# Patient Record
Sex: FEMALE | Race: WHITE | Employment: OTHER | URBAN - NONMETROPOLITAN AREA
[De-identification: names, ages, dates, MRNs, and addresses within clinical notes are randomized per-mention and may not be internally consistent; named-entity substitution may affect disease eponyms.]

---

## 2015-03-05 LAB
CREAT SERPL-MCNC: 0.9 MG/DL (ref 0.7–1.2)
GLUCOSE SERPL-MCNC: 89 MG/DL (ref 60–99)
POTASSIUM SERPL-SCNC: 4.2 MEQ/L (ref 3.5–5.1)
TSH SERPL-ACNC: 1.98 MIU/ML (ref 0.35–4.8)

## 2016-12-24 LAB
ALT SERPL-CCNC: 19 U/L (ref 9–41)
AST SERPL-CCNC: 19 U/L (ref 12–38)
CREAT SERPL-MCNC: 0.9 MG/DL (ref 0.7–1.4)
GLUCOSE SERPL-MCNC: 91 MG/DL (ref 64–112)
POTASSIUM SERPL-SCNC: 4.6 MMOL/L (ref 3.5–5.3)

## 2017-05-05 LAB
CHOLEST SERPL-MCNC: 187 MG/DL (ref 130–200)
HDLC SERPL-MCNC: 45 MG/DL (ref 32–96)
LDLC SERPL CALC-MCNC: 125 MG/DL (ref 3–130)
TRIGL SERPL-MCNC: 87 MG/DL (ref 35–135)

## 2017-05-08 ENCOUNTER — TRANSFERRED RECORDS (OUTPATIENT)
Dept: HEALTH INFORMATION MANAGEMENT | Facility: HOSPITAL | Age: 72
End: 2017-05-08

## 2017-05-10 ENCOUNTER — TRANSFERRED RECORDS (OUTPATIENT)
Dept: HEALTH INFORMATION MANAGEMENT | Facility: HOSPITAL | Age: 72
End: 2017-05-10

## 2017-06-03 ENCOUNTER — TRANSFERRED RECORDS (OUTPATIENT)
Dept: HEALTH INFORMATION MANAGEMENT | Facility: HOSPITAL | Age: 72
End: 2017-06-03

## 2017-06-03 LAB
ALT SERPL-CCNC: 19 U/L (ref 9–41)
AST SERPL-CCNC: 17 U/L (ref 12–38)
CREAT SERPL-MCNC: 0.9 MG/DL (ref 0.7–1.4)
GLUCOSE SERPL-MCNC: 112 MG/DL (ref 64–112)
POTASSIUM SERPL-SCNC: 3.9 MMOL/L (ref 3.5–5.3)
TSH SERPL-ACNC: 1.54 UIU/ML (ref 0.34–4.94)

## 2017-06-05 ENCOUNTER — TRANSFERRED RECORDS (OUTPATIENT)
Dept: HEALTH INFORMATION MANAGEMENT | Facility: HOSPITAL | Age: 72
End: 2017-06-05

## 2017-06-05 DIAGNOSIS — R29.90 STROKE-LIKE SYMPTOMS: Primary | ICD-10-CM

## 2017-06-06 ENCOUNTER — TRANSFERRED RECORDS (OUTPATIENT)
Dept: HEALTH INFORMATION MANAGEMENT | Facility: HOSPITAL | Age: 72
End: 2017-06-06

## 2017-06-08 ENCOUNTER — HOSPITAL ENCOUNTER (EMERGENCY)
Facility: HOSPITAL | Age: 72
Discharge: HOME | End: 2017-06-08
Attending: FAMILY MEDICINE | Admitting: FAMILY MEDICINE
Payer: MEDICARE

## 2017-06-08 ENCOUNTER — HOSPITAL ENCOUNTER (OUTPATIENT)
Dept: MRI IMAGING | Facility: HOSPITAL | Age: 72
Discharge: HOME | End: 2017-06-08
Attending: INTERNAL MEDICINE | Admitting: INTERNAL MEDICINE
Payer: MEDICARE

## 2017-06-08 VITALS
DIASTOLIC BLOOD PRESSURE: 102 MMHG | TEMPERATURE: 97.4 F | SYSTOLIC BLOOD PRESSURE: 149 MMHG | OXYGEN SATURATION: 96 % | HEIGHT: 65 IN | HEART RATE: 76 BPM | WEIGHT: 165 LBS | BODY MASS INDEX: 27.49 KG/M2 | RESPIRATION RATE: 16 BRPM

## 2017-06-08 DIAGNOSIS — I63.9 ISCHEMIC STROKE (H): ICD-10-CM

## 2017-06-08 DIAGNOSIS — R90.89 OTHER ABNORMAL FINDINGS ON DIAGNOSTIC IMAGING OF CENTRAL NERVOUS SYSTEM: Primary | ICD-10-CM

## 2017-06-08 DIAGNOSIS — I65.22 OCCLUSION OF LEFT CAROTID ARTERY: ICD-10-CM

## 2017-06-08 LAB
ANION GAP SERPL CALCULATED.3IONS-SCNC: 8 MMOL/L (ref 3–14)
BASOPHILS # BLD AUTO: 0 10E9/L (ref 0–0.2)
BASOPHILS NFR BLD AUTO: 0.7 %
BUN SERPL-MCNC: 21 MG/DL (ref 7–30)
CALCIUM SERPL-MCNC: 9 MG/DL (ref 8.5–10.1)
CHLORIDE SERPL-SCNC: 107 MMOL/L (ref 94–109)
CO2 SERPL-SCNC: 28 MMOL/L (ref 20–32)
CREAT SERPL-MCNC: 0.94 MG/DL (ref 0.52–1.04)
DIFFERENTIAL METHOD BLD: NORMAL
EOSINOPHIL # BLD AUTO: 0.1 10E9/L (ref 0–0.7)
EOSINOPHIL NFR BLD AUTO: 2.2 %
ERYTHROCYTE [DISTWIDTH] IN BLOOD BY AUTOMATED COUNT: 12.8 % (ref 10–15)
GFR SERPL CREATININE-BSD FRML MDRD: 59 ML/MIN/1.7M2
GLUCOSE SERPL-MCNC: 98 MG/DL (ref 70–99)
HCT VFR BLD AUTO: 45.9 % (ref 35–47)
HGB BLD-MCNC: 15.6 G/DL (ref 11.7–15.7)
IMM GRANULOCYTES # BLD: 0 10E9/L (ref 0–0.4)
IMM GRANULOCYTES NFR BLD: 0.4 %
LYMPHOCYTES # BLD AUTO: 1.3 10E9/L (ref 0.8–5.3)
LYMPHOCYTES NFR BLD AUTO: 27.6 %
MCH RBC QN AUTO: 31.1 PG (ref 26.5–33)
MCHC RBC AUTO-ENTMCNC: 34 G/DL (ref 31.5–36.5)
MCV RBC AUTO: 91 FL (ref 78–100)
MONOCYTES # BLD AUTO: 0.5 10E9/L (ref 0–1.3)
MONOCYTES NFR BLD AUTO: 11.2 %
NEUTROPHILS # BLD AUTO: 2.7 10E9/L (ref 1.6–8.3)
NEUTROPHILS NFR BLD AUTO: 57.9 %
NRBC # BLD AUTO: 0 10*3/UL
NRBC BLD AUTO-RTO: 0 /100
PLATELET # BLD AUTO: 178 10E9/L (ref 150–450)
POTASSIUM SERPL-SCNC: 4.3 MMOL/L (ref 3.4–5.3)
RBC # BLD AUTO: 5.02 10E12/L (ref 3.8–5.2)
SODIUM SERPL-SCNC: 143 MMOL/L (ref 133–144)
WBC # BLD AUTO: 4.6 10E9/L (ref 4–11)

## 2017-06-08 PROCEDURE — 93005 ELECTROCARDIOGRAM TRACING: CPT

## 2017-06-08 PROCEDURE — 93880 EXTRACRANIAL BILAT STUDY: CPT | Mod: TC

## 2017-06-08 PROCEDURE — 25000128 H RX IP 250 OP 636: Performed by: FAMILY MEDICINE

## 2017-06-08 PROCEDURE — 93010 ELECTROCARDIOGRAM REPORT: CPT | Performed by: INTERNAL MEDICINE

## 2017-06-08 PROCEDURE — 36415 COLL VENOUS BLD VENIPUNCTURE: CPT | Performed by: FAMILY MEDICINE

## 2017-06-08 PROCEDURE — 85025 COMPLETE CBC W/AUTO DIFF WBC: CPT | Performed by: FAMILY MEDICINE

## 2017-06-08 PROCEDURE — 70496 CT ANGIOGRAPHY HEAD: CPT | Mod: TC

## 2017-06-08 PROCEDURE — 99285 EMERGENCY DEPT VISIT HI MDM: CPT | Mod: 25

## 2017-06-08 PROCEDURE — 99285 EMERGENCY DEPT VISIT HI MDM: CPT | Performed by: FAMILY MEDICINE

## 2017-06-08 PROCEDURE — 70498 CT ANGIOGRAPHY NECK: CPT | Mod: TC

## 2017-06-08 PROCEDURE — 80048 BASIC METABOLIC PNL TOTAL CA: CPT | Performed by: FAMILY MEDICINE

## 2017-06-08 RX ORDER — IOPAMIDOL 755 MG/ML
60 INJECTION, SOLUTION INTRAVASCULAR ONCE
Status: COMPLETED | OUTPATIENT
Start: 2017-06-08 | End: 2017-06-08

## 2017-06-08 RX ORDER — CLOPIDOGREL BISULFATE 75 MG/1
75 TABLET ORAL DAILY
Qty: 14 TABLET | Refills: 0 | Status: SHIPPED | OUTPATIENT
Start: 2017-06-08 | End: 2017-06-08

## 2017-06-08 RX ORDER — CLOPIDOGREL BISULFATE 75 MG/1
75 TABLET ORAL ONCE
Status: DISCONTINUED | OUTPATIENT
Start: 2017-06-08 | End: 2017-06-08

## 2017-06-08 RX ORDER — GADOBUTROL 604.72 MG/ML
7.5 INJECTION INTRAVENOUS ONCE
Status: COMPLETED | OUTPATIENT
Start: 2017-06-08 | End: 2017-06-08

## 2017-06-08 RX ORDER — ATORVASTATIN CALCIUM 10 MG/1
10 TABLET, FILM COATED ORAL DAILY
Qty: 30 TABLET | Refills: 1 | Status: SHIPPED | OUTPATIENT
Start: 2017-06-08 | End: 2018-02-16 | Stop reason: SINTOL

## 2017-06-08 RX ORDER — OMEGA-3-ACID ETHYL ESTERS 1 G/1
1500 CAPSULE, LIQUID FILLED ORAL DAILY
COMMUNITY
End: 2017-11-07

## 2017-06-08 RX ADMIN — SODIUM CHLORIDE 1000 ML: 9 INJECTION, SOLUTION INTRAVENOUS at 19:32

## 2017-06-08 RX ADMIN — IOPAMIDOL 75 ML: 755 INJECTION, SOLUTION INTRAVASCULAR at 20:06

## 2017-06-08 RX ADMIN — GADOBUTROL 7.5 ML: 604.72 INJECTION INTRAVENOUS at 17:09

## 2017-06-08 ASSESSMENT — ENCOUNTER SYMPTOMS
FACIAL ASYMMETRY: 0
DIARRHEA: 0
NAUSEA: 0
DIFFICULTY URINATING: 0
VOMITING: 0
NERVOUS/ANXIOUS: 1
COUGH: 0
CHEST TIGHTNESS: 0
NUMBNESS: 0
FATIGUE: 0
ABDOMINAL PAIN: 0
MUSCULOSKELETAL NEGATIVE: 1
FEVER: 0
ACTIVITY CHANGE: 1
APPETITE CHANGE: 0
CONSTIPATION: 0
SPEECH DIFFICULTY: 1
WHEEZING: 0
SHORTNESS OF BREATH: 0
DIZZINESS: 0
DYSURIA: 0

## 2017-06-08 NOTE — ED AVS SNAPSHOT
[facility]    [address] [place] [address]    Phone:  [phone]                                       Emily Olson   MRN: [id number]    Department:  [facility]   Date of Visit:  6/8/2017           After Visit Summary Signature Page     I have received my discharge instructions, and my questions have been answered. I have discussed any challenges I see with this plan with the nurse or doctor.    ..........................................................................................................................................  Patient/Patient Representative Signature      ..........................................................................................................................................  Patient Representative Print Name and Relationship to Patient    ..................................................               ................................................  Date                                            Time    ..........................................................................................................................................  Reviewed by Signature/Title    ...................................................              ..............................................  Date                                                            Time

## 2017-06-08 NOTE — ED AVS SNAPSHOT
[facility]    [address]    [place] [address]    Phone:  [phone]                                       Emily Olson   MRN: [id number]    Department:  [facility]   Date of Visit:  6/8/2017           Patient Information     Date Of Birth          1945        Your diagnoses for this visit were:     Ischemic stroke (H)     Occlusion of left carotid artery internal       You were seen by Judy Roblero MD.      Follow-up Information     Follow up with Cynthia Aleman MD. Call in 1 day.    Contact information:    [facility]  [address]  [facility] [address]  [phone]          Discharge Instructions         Carotid Artery Problems: Blockage  The blood carries oxygen and nutrients throughout the body. The carotid arteries are large blood vessels that carry blood to the brain. Certain health problems can make the inside of the carotid arteries narrow and rough. Over time, this damage increases the chances of having a stroke. A stroke is a sudden loss of brain function.   Open carotid arteries      A healthy carotid artery lets blood flow easily to the brain.   In a healthy carotid artery, the inside of the artery is open. The lining of the artery wall is also smooth. This lets blood flow freely from the heart to the brain. The brain gets all the oxygen and nutrients it needs to function well.  Narrowed carotid arteries  Health factors, such as high blood pressure, high cholesterol, smoking, and diabetes, can damage artery walls and make them rough. This allows cholesterol and other particles in the blood to stick to the artery walls and form plaque or fatty deposits. As the plaque builds up, it can narrow the artery. Blood may also collect on the plaque and form blood clots.  How a stroke happens  A stroke can happen when plaque in the carotid artery ruptures. This can allow small pieces of plaque to break off into the  bloodstream. At the same time, rupture can make more blood clots. The pieces of plaque and tiny blood clots or emboli can flow in the blood until they get stuck in a small blood vessel in the brain. This blocks blood flow to part of the brain and causes a stroke.     Emboli can enter the bloodstream and travel to the brain. Brain tissue is damaged when emboli block arteries in the brain.       4875-1775 [employer]. [address] [place] [address], [place] [address]. All rights reserved. This information is not intended as a substitute for professional medical care. Always follow your healthcare professional's instructions.          Stroke (Completed)    You have had a mild stroke, or cerebrovascular accident (CVA). This is caused by a loss of blood flow to part of your brain. This can occur when a blood clot forms inside the carotid artery (main artery from the heart to the brain) or inside the heart. When the clot travels to the brain, it can lodge in a blood vessel and block blood flow. The other common cause of stroke is a gradual narrowing of the arteries in the brain due to buildup of fatty deposits (plaque).  Symptoms  Blocked blood flow in different areas of the brain can cause different symptoms. If you have had a stroke before, a new one may be different. A memory aid for the basic signs of a stroke is F.A.S.T.  F.A.S.T.    F: Face drooping, or numbness on one side. This may be more noticeable when you ask the affected person to smile.    A: Arm weakness or numbness. The affected person may have trouble using or lifting one side.    S: Speech difficulty. Speech may be slurred or hard to understand. The affected person may also use the wrong words.    T: Time to call 911. Time is critical in treating a stroke. Call 911 as soon as you suspect a stroke has happened--even a small one. The sooner treatment is started the better, even if the symptoms go away.  Other common symptoms of a stroke  include:    Having difficulty getting the right words to come out    Weakness in one leg    Numbness on one side    Difficulty walking    Trouble with coordination    Trouble with vision    Headache    Confusion    Dizziness  Treatment  After you have had a stroke, you are at risk of having another. Be sure to follow up with your healthcare provider for further evaluation and treatment. If problems are found, your healthcare provider will recommend treatment with medicines and/or procedures.  To reduce your chance of having another stroke, you may be prescribed medicines. These include medicines to prevent blood clots, such as antiplatelet or anticoagulant medicines.  Home care    Rest at home and avoid exertion for the next few days.    If your healthcare provider has prescribed medicines, take them as directed.  Follow-up care  Follow up with your healthcare provider, or as advised. Additional tests may be needed. If you had an X-ray, CT scan, MRI, or ECG (electrocardiogram), it will be reviewed by a specialist. You will be notified of any new findings that will affect your care.  Call 911   Contact emergency services right away if any of these occur:    Any of your stroke symptoms worsen    New problems with speech, confusion, vision, walking, coordination, facial droop, or weakness or numbness on one side of your body    Severe headache, fainting spell, dizziness, or seizure    Chest pain or shortness of breath  Remember F.A.S.T. (described above). If you notice warning signs and symptoms of stroke, CALL 911 without delay.    4486-3115 [employer]. [address] [place] [address]. All rights reserved. This information is not intended as a substitute for professional medical care. Always follow your healthcare professional's instructions.          ED Discharge Orders     Echocardiogram Complete                    Review of your medicines      START taking        Dose / Directions Last dose  taken    atorvastatin 10 MG tablet   Commonly known as:  LIPITOR   Dose:  10 mg   Quantity:  30 tablet        Take 1 tablet (10 mg) by mouth daily   Refills:  1        clopidogrel 75 MG tablet   Commonly known as:  PLAVIX   Dose:  75 mg   Quantity:  14 tablet        Take 1 tablet (75 mg) by mouth daily for 14 days   Refills:  0          Our records show that you are taking the medicines listed below. If these are incorrect, please call your family doctor or clinic.        Dose / Directions Last dose taken    aspirin 81 MG chewable tablet        Chew 1 tablet (81mg) by oral route every day   Refills:  0        calcium-vitamin D 600-400 MG-UNIT per tablet   Commonly known as:  CALTRATE   Dose:  1 tablet        Take 1 tablet by mouth daily   Refills:  0        DAILY MULTIVITAMIN PO        Take 1 tablet by oral route every day with food   Refills:  0        omega-3 acid ethyl esters 1 G capsule   Commonly known as:  Lovaza   Dose:  1500 g        Take 1,500 g by mouth daily   Refills:  0        OMEPRAZOLE PO   Dose:  20 mg        Take 20 mg by mouth every morning   Refills:  0                Prescriptions were sent or printed at these locations (2 Prescriptions)                   [facility] - [place], [place] [address] AT [facility] [address]   [address] [place] [address]    Telephone:  [phone]   Fax:  [phone]   Hours:                  Printed at Department/Unit printer (2 of 2)         clopidogrel (PLAVIX) 75 MG tablet               atorvastatin (LIPITOR) 10 MG tablet                Procedures and tests performed during your visit     Basic metabolic panel    CBC with platelets differential    CTA Angiogram Head    CTA Angiogram Neck    EKG 12 lead    US Carotid Bilateral      Orders Needing Specimen Collection     None      Pending Results     Date and Time Order Name Status Description    6/8/2017 1917 CTA Angiogram Neck In process     6/8/2017 1917 CTA Angiogram  "Head In process     2017 1750 US Carotid Bilateral In process             Pending Culture Results     No orders found from 2017 to 2017.            Thank you for choosing [facility]       Thank you for choosing [facility] for your care. Our goal is always to provide you with excellent care. Hearing back from our patients is one way we can continue to improve our services. Please take a few minutes to complete the written survey that you may receive in the mail after you visit with us. Thank you!        [employer] Information     [employer] lets you send messages to your doctor, view your test results, renew your prescriptions, schedule appointments and more. To sign up, go to [email or web address] . Click on \"Log in\" on the left side of the screen, which will take you to the Welcome page. Then click on \"Sign up Now\" on the right side of the page.     You will be asked to enter the access code listed below, as well as some personal information. Please follow the directions to create your username and password.     Your access code is: [email or web address]  Expires: 2017 10:03 PM     Your access code will  in 90 days. If you need help or a new code, please call your [facility] clinic or [phone].        Care EveryWhere ID     This is your Care EveryWhere ID. This could be used by other organizations to access your [facility] medical records  [id number]        After Visit Summary       This is your record. Keep this with you and show to your community pharmacist(s) and doctor(s) at your next visit.                  "

## 2017-06-08 NOTE — ED PROVIDER NOTES
History     Chief Complaint   Patient presents with     Cerebrovascular Accident     pt coming from MRI, dx watershed infarct     HPI  Emily Olson is a 72 year old female who was seen in [place] on Friday last week after having had difficulty with speech, incoordination and weakness in the right arm and perhaps a slight right facial droop.  She was given aspirin and fluids and sent home according to the family.  Her pulmonologist ordered an MRI of her head suspecting a stroke, so she came here.  The MRI showed a left sided watershed infarct, and radiology sent the patient to the ED after the scan was completed.  On arrival here she was evaluated for stroke, she had minimal deficits that are not appreciable on gross testing, but family notes that she is not able to find words when talking and she has been having issues with writing - she can sign her name but is not able to write in her diary, a thing she does every day and has since she was 9.  Her family is frustrated, worried, but wants to do the best thing for her at this point.  She has no primary care physician, has been seeing Dr. Navarro at [facility] for her lungs.  She prefers to doctor in [place], but would be ok with doing therapy in [place].    I have reviewed the Medications, Allergies, Past Medical and Surgical History, and Social History in the Epic system.    Review of Systems   Constitutional: Positive for activity change. Negative for appetite change, fatigue and fever.   HENT: Negative.    Respiratory: Negative for cough, chest tightness, shortness of breath and wheezing.    Cardiovascular: Negative for chest pain.   Gastrointestinal: Negative for abdominal pain, constipation, diarrhea, nausea and vomiting.   Genitourinary: Negative for difficulty urinating and dysuria.   Musculoskeletal: Negative.    Skin: Positive for pallor.   Neurological: Positive for speech difficulty. Negative for dizziness, facial asymmetry and numbness.         "Incoordination in right hand, difficulty putting thoughts on paper.   Psychiatric/Behavioral: The patient is nervous/anxious.        Physical Exam   BP: (!) 182/93  Heart Rate: 78  Temp: 98.8  F (37.1  C)  Resp: 20  Height: 165.1 cm (5' 5\")  Weight: 74.8 kg (165 lb)  SpO2: 95 %  Physical Exam   Constitutional: She is oriented to person, place, and time. She appears well-developed and well-nourished. No distress.   HENT:   Head: Normocephalic and atraumatic.   Neck: Normal range of motion. Neck supple.   Cardiovascular: Normal rate, regular rhythm, normal heart sounds and intact distal pulses.    No murmur heard.  Pulmonary/Chest: Effort normal and breath sounds normal. No respiratory distress. She has no wheezes.   Abdominal: Soft. Bowel sounds are normal. She exhibits no distension. There is tenderness.   Musculoskeletal: Normal range of motion. She exhibits no edema.   Neurological: She is alert and oriented to person, place, and time. No cranial nerve deficit. She exhibits normal muscle tone. Coordination abnormal.   Skin: Skin is warm and dry.   Psychiatric: She has a normal mood and affect. Judgment and thought content normal.   Nursing note and vitals reviewed.      ED Course     ED Course     Procedures             EKG Interpretation:      Interpreted by Judy Sheldon  Time reviewed: 1750  Symptoms at time of EKG: s/p stroke   Rhythm: normal sinus   Rate: normal  Axis: normal  Ectopy: none  Conduction: normal  ST Segments/ T Waves: No ST-T wave changes  Q Waves: none  Comparison to prior: Unchanged    Clinical Impression: normal EKG    The patient has stroke symptoms:           ED Stroke specific documentation           NIHSS PDF          Protocol PDF     Patient last known well time: last Thursday (7 days ago)  ED Provider first to bedside at: 1730  CT Results received at: N/A  Patient was not treated with TPA due to the following reason(s):  Out of the treatment time window  Mild stroke symptoms " ( NIHSS < 4 and not globally aphasic)  Isolated mild neurological deficits such as ataxia alone, sensory loss alone, dysarthria alone or minimal weakness ( NIHSS < 4 AND normal language AND visual fields )    National Institutes of Health Stroke Scale (Baseline)  Time Performed: 1730      Score    Level of consciousness: (0)   Alert, keenly responsive    LOC questions: (0)   Answers both questions correctly    LOC commands: (0)   Performs both tasks correctly    Best gaze: (0)   Normal    Visual: (0)   No visual loss    Facial palsy: (0)   Normal symmetrical movements    Motor arm (left): (0)   No drift    Motor arm (right): (0)   No drift    Motor leg (left): (0)   No drift    Motor leg (right): (0)   No drift    Limb ataxia: (0)   Absent    Sensory: (0)   Normal- no sensory loss    Best language: (1)   Mild to moderate aphasia    Dysarthria: (0)   Normal    Extinction and inattention: (0)   No abnormality        Total Score:  1      Patient came to ED after stroke was diagnosed by MRI.  Deficits are as noted in HPI.  Stroke likely 1 week old, she has been steadily improving at home without treatment.  She is now able to write her name, but is not doing well with freehand writing.         Labs Ordered and Resulted from Time of ED Arrival Up to the Time of Departure from the ED   BASIC METABOLIC PANEL - Abnormal; Notable for the following:        Result Value    GFR Estimate 59 (*)     All other components within normal limits   CBC WITH PLATELETS DIFFERENTIAL       Assessments & Plan (with Medical Decision Making)   Family doing much better.  Dr. Shaikh ok'd having CTA tonight.  Will hydrate her and get that done.  Patient comfortable.    Patient signed out to Dr. Kendall at change of shift pending CTA of head and neck.    CTA negative excepting complete occlusion of the internal carotid artery on the left.   working on obtaining primary care and neurology appointment for patient, echocardiogram  will be scheduled in AM.  Speech and OT will be scheduled tomorrow as well.    I have reviewed the nursing notes.    I have reviewed the findings, diagnosis, plan and need for follow up with the patient.    Discharge Medication List as of 6/8/2017 10:11 PM      START taking these medications    Details   atorvastatin (LIPITOR) 10 MG tablet Take 1 tablet (10 mg) by mouth daily, Disp-30 tablet, R-1, Local Print      clopidogrel (PLAVIX) 75 MG tablet Take 1 tablet (75 mg) by mouth daily for 14 days, Disp-14 tablet, R-0, Local Print             Final diagnoses:   Ischemic stroke (H)   Occlusion of left carotid artery - internal       6/8/2017   [facility]     Judy Roblero MD  06/09/17 1778

## 2017-06-08 NOTE — ED NOTES
currently at bedside FAST exam negative, pt denies pain, See assessments. Multiple family members at bedside.

## 2017-06-08 NOTE — ED NOTES
"Pt to room 10, ambulated with family, from MRI.  Pt was in [facility] ER 1 week prior, dx with TIA, given 4 asa 81mg, and one liter bolus.  Pt then was seen in er for pulmonary appt, who scheduled the MRI she had today, a carotic US, and echo, which pt has yet to receive.  Family in room with pt, upset with \"not knowing what is going on\".  Dr Solano to room to speak with family.    "

## 2017-06-09 ENCOUNTER — TELEPHONE (OUTPATIENT)
Dept: FAMILY MEDICINE | Facility: OTHER | Age: 72
End: 2017-06-09

## 2017-06-09 DIAGNOSIS — I63.9 ISCHEMIC STROKE (H): ICD-10-CM

## 2017-06-09 NOTE — TELEPHONE ENCOUNTER
9:53 AM    Reason for Call: OVERBOOK    Patient is having the following symptoms: ER follow up/stroke  for 1 weeks.    The patient is requesting an appointment for next week (ER is requesting) with Dr Lora. Pt would like to est care after    Was an appointment offered for this call? Yes    Preferred method for responding to this message: Telephone Call  Ext [phone]    If we cannot reach you directly, may we leave a detailed response at the number you provided? Yes    Can this message wait until your PCP/provider returns, if unavailable today? Not applicable    Juliann Kate

## 2017-06-09 NOTE — ED PROVIDER NOTES
CT angio: complete obstruction of left carotid artery  I spoke to Dr Aleman, she did not recommend any vascular  intervention and advised that pt can be discharged home with follow up with neurologist, statin and antiplatelet  I advised pt and her  to call neurologist at AM to get an expedited appointment, fu with official report of MRI

## 2017-06-09 NOTE — ED NOTES
Discharge instructions given. Instructed to call Dr. Aleman at the number listed on discharge instructions tomorrow. Instructed to fill prescriptions and start tomorrow. Verbalized understanding. Denies pain. Ambulated out of ED with  at side holding hand.

## 2017-06-09 NOTE — ED NOTES
Face to face report given with opportunity to observe patient.    Report given to Luci COULTER   6/8/2017  7:07 PM

## 2017-06-09 NOTE — DISCHARGE INSTRUCTIONS
Carotid Artery Problems: Blockage  The blood carries oxygen and nutrients throughout the body. The carotid arteries are large blood vessels that carry blood to the brain. Certain health problems can make the inside of the carotid arteries narrow and rough. Over time, this damage increases the chances of having a stroke. A stroke is a sudden loss of brain function.   Open carotid arteries      A healthy carotid artery lets blood flow easily to the brain.   In a healthy carotid artery, the inside of the artery is open. The lining of the artery wall is also smooth. This lets blood flow freely from the heart to the brain. The brain gets all the oxygen and nutrients it needs to function well.  Narrowed carotid arteries  Health factors, such as high blood pressure, high cholesterol, smoking, and diabetes, can damage artery walls and make them rough. This allows cholesterol and other particles in the blood to stick to the artery walls and form plaque or fatty deposits. As the plaque builds up, it can narrow the artery. Blood may also collect on the plaque and form blood clots.  How a stroke happens  A stroke can happen when plaque in the carotid artery ruptures. This can allow small pieces of plaque to break off into the bloodstream. At the same time, rupture can make more blood clots. The pieces of plaque and tiny blood clots or emboli can flow in the blood until they get stuck in a small blood vessel in the brain. This blocks blood flow to part of the brain and causes a stroke.     Emboli can enter the bloodstream and travel to the brain. Brain tissue is damaged when emboli block arteries in the brain.       7366-6687 [employer]. [address] [place] [address], [place] [address]. All rights reserved. This information is not intended as a substitute for professional medical care. Always follow your healthcare professional's instructions.          Stroke (Completed)    You have had a mild stroke, or  cerebrovascular accident (CVA). This is caused by a loss of blood flow to part of your brain. This can occur when a blood clot forms inside the carotid artery (main artery from the heart to the brain) or inside the heart. When the clot travels to the brain, it can lodge in a blood vessel and block blood flow. The other common cause of stroke is a gradual narrowing of the arteries in the brain due to buildup of fatty deposits (plaque).  Symptoms  Blocked blood flow in different areas of the brain can cause different symptoms. If you have had a stroke before, a new one may be different. A memory aid for the basic signs of a stroke is F.A.S.T.  F.A.S.T.    F: Face drooping, or numbness on one side. This may be more noticeable when you ask the affected person to smile.    A: Arm weakness or numbness. The affected person may have trouble using or lifting one side.    S: Speech difficulty. Speech may be slurred or hard to understand. The affected person may also use the wrong words.    T: Time to call 911. Time is critical in treating a stroke. Call 911 as soon as you suspect a stroke has happened--even a small one. The sooner treatment is started the better, even if the symptoms go away.  Other common symptoms of a stroke include:    Having difficulty getting the right words to come out    Weakness in one leg    Numbness on one side    Difficulty walking    Trouble with coordination    Trouble with vision    Headache    Confusion    Dizziness  Treatment  After you have had a stroke, you are at risk of having another. Be sure to follow up with your healthcare provider for further evaluation and treatment. If problems are found, your healthcare provider will recommend treatment with medicines and/or procedures.  To reduce your chance of having another stroke, you may be prescribed medicines. These include medicines to prevent blood clots, such as antiplatelet or anticoagulant medicines.  Home care    Rest at home and avoid  exertion for the next few days.    If your healthcare provider has prescribed medicines, take them as directed.  Follow-up care  Follow up with your healthcare provider, or as advised. Additional tests may be needed. If you had an X-ray, CT scan, MRI, or ECG (electrocardiogram), it will be reviewed by a specialist. You will be notified of any new findings that will affect your care.  Call 911   Contact emergency services right away if any of these occur:    Any of your stroke symptoms worsen    New problems with speech, confusion, vision, walking, coordination, facial droop, or weakness or numbness on one side of your body    Severe headache, fainting spell, dizziness, or seizure    Chest pain or shortness of breath  Remember F.A.S.T. (described above). If you notice warning signs and symptoms of stroke, CALL 911 without delay.    2074-0002 [employer]. [address] [place] [address]. All rights reserved. This information is not intended as a substitute for professional medical care. Always follow your healthcare professional's instructions.

## 2017-06-10 NOTE — PROGRESS NOTES
CT Angiogram of Neck report routed to Dr. ABDOUL Lora.  Impression - total occlusion of the left internal carotid artery at its origin.  Pt has follow up appt with Dr. Lora on 6/23/17.

## 2017-06-10 NOTE — PROGRESS NOTES
CT Angiogram of Brain w/o and w IV contrast report routed to Dr. ABDOUL Lora.  Pt has follow up appt on 6/23/17 with Dr. Lora.  Findings -  There is total occlusion of the internal carotid artery on the left.  Negative CT scan of the brain.

## 2017-06-10 NOTE — PROGRESS NOTES
Ultrasound of Carotids report routed to Dr. ABDOUL Lora.  Impression - total occlusion of the left internal carotid artery.  Pt has follow up appt scheduled with Dr. Lora on 6/23/17.

## 2017-06-14 ENCOUNTER — HOSPITAL ENCOUNTER (OUTPATIENT)
Dept: ULTRASOUND IMAGING | Facility: HOSPITAL | Age: 72
Discharge: HOME | End: 2017-06-14
Attending: FAMILY MEDICINE | Admitting: FAMILY MEDICINE
Payer: MEDICARE

## 2017-06-14 PROCEDURE — 93306 TTE W/DOPPLER COMPLETE: CPT | Mod: 26 | Performed by: INTERNAL MEDICINE

## 2017-06-14 PROCEDURE — 93306 TTE W/DOPPLER COMPLETE: CPT | Mod: TC

## 2017-06-23 ENCOUNTER — OFFICE VISIT (OUTPATIENT)
Dept: FAMILY MEDICINE | Facility: OTHER | Age: 72
End: 2017-06-23
Attending: FAMILY MEDICINE
Payer: MEDICARE

## 2017-06-23 VITALS
OXYGEN SATURATION: 98 % | RESPIRATION RATE: 19 BRPM | SYSTOLIC BLOOD PRESSURE: 130 MMHG | BODY MASS INDEX: 27.46 KG/M2 | WEIGHT: 165 LBS | HEART RATE: 89 BPM | TEMPERATURE: 98.4 F | DIASTOLIC BLOOD PRESSURE: 74 MMHG

## 2017-06-23 DIAGNOSIS — I63.9 LEFT SIDED CEREBRAL HEMISPHERE CEREBROVASCULAR ACCIDENT (H): Primary | ICD-10-CM

## 2017-06-23 DIAGNOSIS — Z71.89 ACP (ADVANCE CARE PLANNING): Chronic | ICD-10-CM

## 2017-06-23 DIAGNOSIS — I65.22 LEFT CAROTID ARTERY OCCLUSION: ICD-10-CM

## 2017-06-23 PROCEDURE — 99213 OFFICE O/P EST LOW 20 MIN: CPT

## 2017-06-23 PROCEDURE — 99203 OFFICE O/P NEW LOW 30 MIN: CPT | Performed by: FAMILY MEDICINE

## 2017-06-23 RX ORDER — CLOPIDOGREL BISULFATE 75 MG/1
75 TABLET ORAL DAILY
Qty: 30 TABLET | Refills: 3 | Status: SHIPPED | OUTPATIENT
Start: 2017-06-23 | End: 2019-05-10

## 2017-06-23 RX ORDER — CLOPIDOGREL BISULFATE 75 MG/1
75 TABLET ORAL DAILY
COMMUNITY
End: 2017-06-23

## 2017-06-23 ASSESSMENT — ANXIETY QUESTIONNAIRES
4. TROUBLE RELAXING: NOT AT ALL
1. FEELING NERVOUS, ANXIOUS, OR ON EDGE: NOT AT ALL
3. WORRYING TOO MUCH ABOUT DIFFERENT THINGS: NOT AT ALL
5. BEING SO RESTLESS THAT IT IS HARD TO SIT STILL: NOT AT ALL
6. BECOMING EASILY ANNOYED OR IRRITABLE: NOT AT ALL
2. NOT BEING ABLE TO STOP OR CONTROL WORRYING: NOT AT ALL
7. FEELING AFRAID AS IF SOMETHING AWFUL MIGHT HAPPEN: NOT AT ALL
GAD7 TOTAL SCORE: 0
IF YOU CHECKED OFF ANY PROBLEMS ON THIS QUESTIONNAIRE, HOW DIFFICULT HAVE THESE PROBLEMS MADE IT FOR YOU TO DO YOUR WORK, TAKE CARE OF THINGS AT HOME, OR GET ALONG WITH OTHER PEOPLE: NOT DIFFICULT AT ALL

## 2017-06-23 ASSESSMENT — PAIN SCALES - GENERAL: PAINLEVEL: NO PAIN (0)

## 2017-06-23 NOTE — MR AVS SNAPSHOT
"              After Visit Summary   6/23/2017    Emily Olson    MRN: [id number]           Patient Information     Date Of Birth          1945        Visit Information        Provider Department      6/23/2017 10:15 AM Theodora Lora MD [facility]        Today's Diagnoses     Left sided cerebral hemisphere cerebrovascular accident (H)    -  1    Left carotid artery occlusion        ACP (advance care planning)          Care Instructions    Continue both the Lipitor and the Plavix.  Suspect neurology will recommend higher dose of Lipitor.  Follow through with neurology consult on 6/30 with Dr. Cynthia Aleman.  Continue OT and speech therapy.  Keep hydrated.  Rest - do not over do it!          Follow-ups after your visit        Who to contact     If you have questions or need follow up information about today's clinic visit or your schedule please contact [facility] directly at [phone].  Normal or non-critical lab and imaging results will be communicated to you by MyChart, letter or phone within 4 business days after the clinic has received the results. If you do not hear from us within 7 days, please contact the clinic through MyChart or phone. If you have a critical or abnormal lab result, we will notify you by phone as soon as possible.  Submit refill requests through [employer] or call your pharmacy and they will forward the refill request to us. Please allow 3 business days for your refill to be completed.          Additional Information About Your Visit        MyChart Information     [employer] lets you send messages to your doctor, view your test results, renew your prescriptions, schedule appointments and more. To sign up, go to [email or web address] . Click on \"Log in\" on the left side of the screen, which will take you to the Welcome page. Then click on \"Sign up Now\" on the right side of the page.     You will be asked to enter the access code listed below, as " well as some personal information. Please follow the directions to create your username and password.     Your access code is: [email or web address]  Expires: 2017 10:03 PM     Your access code will  in 90 days. If you need help or a new code, please call your [place] clinic or [phone].        Care EveryWhere ID     This is your Care EveryWhere ID. This could be used by other organizations to access your [facility] medical records  [id number]        Your Vitals Were     Pulse Temperature Respirations Pulse Oximetry Breastfeeding? BMI (Body Mass Index)    89 98.4  F (36.9  C) (Tympanic) 19 98% No 27.46 kg/m2       Blood Pressure from Last 3 Encounters:   17 130/74   17 (!) 149/102    Weight from Last 3 Encounters:   17 165 lb (74.8 kg)   17 165 lb (74.8 kg)              Today, you had the following     No orders found for display         Where to get your medicines      These medications were sent to [facility] [address] [place] [address]  AT [facility] [address]  [address] [facility] [address]     Phone:  [phone]     clopidogrel 75 MG tablet          Primary Care Provider    None       No address on file        Equal Access to Services     ALLI PHILIP AH: Hadii chelsie ku hadasho Soomaali, waaxda luqadaha, qaybta kaalmada adeegyada, waxay ameyain haynathaly jarrell. So [facility] [phone].    ATENCIÓN: Si habla español, tiene a torres disposición servicios gratuitos de asistencia lingüística. Llame al [phone].    We comply with applicable federal civil rights laws and [place] laws. We do not discriminate on the basis of race, color, national origin, age, disability sex, sexual orientation or gender identity.            Thank you!     Thank you for choosing [facility] [place]  for your care. Our goal is always to provide you with excellent care. Hearing back from our patients is one way we can continue to improve our services.  Please take a few minutes to complete the written survey that you may receive in the mail after your visit with us. Thank you!             Your Updated Medication List - Protect others around you: Learn how to safely use, store and throw away your medicines at www.disposemymeds.org.          This list is accurate as of: 6/23/17 10:29 AM.  Always use your most recent med list.                   Brand Name Dispense Instructions for use Diagnosis    aspirin 81 MG chewable tablet      2 times daily Chew 1 tablet (81mg) by oral route every day        atorvastatin 10 MG tablet    LIPITOR    30 tablet    Take 1 tablet (10 mg) by mouth daily        calcium-vitamin D 600-400 MG-UNIT per tablet    CALTRATE     Take 1 tablet by mouth daily        clopidogrel 75 MG tablet    PLAVIX    30 tablet    Take 1 tablet (75 mg) by mouth daily    Left sided cerebral hemisphere cerebrovascular accident (H)       DAILY MULTIVITAMIN PO      Take 1 tablet by oral route every day with food        omega-3 acid ethyl esters 1 G capsule    Lovaza     Take 1,500 g by mouth daily        OMEPRAZOLE PO      Take 20 mg by mouth every morning

## 2017-06-23 NOTE — PROGRESS NOTES
SUBJECTIVE:  Emily is a 72 year old female who comes in today for establish care visit.     Prior visit in [place], [place], Dr. Cheko ADLER. Had encephalopathy of unknown origin per [facility] ER notes. ER visit first week of June. Symptoms of slowed speech and slowed responses.  Evaluating for ischemia vs dementia.  MMSE 26/30 at that time.  Family frustrated with lack of evaluation.  Patient was ward fatigued, and  called her pulmonologist, as he thought perhaps she was having another lung issue, such as PE prior.    Her pulmonologist ordered the MRI, and was sent to our ER directly from imaging.  It showed left sided watershed infarct.  CTA then showed complete obstruction left carotid.  Dr. Aleman, neurology [facility], recommended DC home with follow with neurology, start statin, and Plavix.  Set up with speech and OT.     Patient has been doing well since.  She is in therapy and making progress.  Family feels she is getting back to baseline.  Here with  John and sister Taylor.    Symptoms of emotional lability/crying, word finding and right hand weakness, tremor, lack of coordination (unable to write), have much improved.    Echo was negative.    Lives with  on [facility].  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  Independent.     PMHx:  GERD - on Prilosec.  Pulmonary nodule - CT scans annually.  History of PE - isolated, s/p treatment.      Current Outpatient Prescriptions   Medication     clopidogrel (PLAVIX) 75 MG tablet     omega-3 acid ethyl esters (LOVAZA) 1 G capsule     calcium-vitamin D (CALTRATE) 600-400 MG-UNIT per tablet     OMEPRAZOLE PO     atorvastatin (LIPITOR) 10 MG tablet     aspirin 81 MG chewable tablet     Multiple Vitamin (DAILY MULTIVITAMIN PO)     [DISCONTINUED] clopidogrel (PLAVIX) 75 MG tablet     No current facility-administered medications for this visit.         Allergies   Allergen Reactions     Animal Dander      Cat and dog     Dust Mites Other (See  Comments)     House Dust       Grass Other (See Comments)     Grass Poll-Perennial R[place], STD       Sulfa Drugs        Past Medical History:   Diagnosis Date     Bilateral lower extremity edema      Encephalopathy     6/2017; ER, Dr Still, [place], [place]; slowed speech and responses; ?ischemic vs dementia; ordered echo, MRI, carotid u/s; neuro referral; MMSE 26/30     GERD (gastroesophageal reflux disease)      Hyperlipidemia, unspecified      Osteoporosis      Pulmonary embolism (H) 2014    [facility]     Pulmonary nodule     CT 5/2017; repeat 9 months     Sebaceous cyst of labia      Trochanteric bursitis, right hip      Urge incontinence      Past Surgical History:   Procedure Laterality Date     breast biopsy/benign      LT     total abdominal hysterectomy  1976    cervix removed as well     Family History   Problem Relation Age of Onset     CANCER Father 62     Lung, cause of death     Breast Cancer Mother 90     Breast Cancer Sister      Breast Cancer Sister      Prostate Cancer Brother      Social History     Social History     Marital status:      Spouse name: N/A     Number of children: N/A     Years of education: N/A     Occupational History     Not on file.     Social History Main Topics     Smoking status: Never Smoker     Smokeless tobacco: Not on file     Alcohol use No     Drug use: Not on file     Sexual activity: Not on file     Other Topics Concern     Caffeine Concern No     Social History Narrative       ROS:  General: negative for, fever, headaches  Skin: negative for, rash, bruising  Eyes: negative for, visual blurring, double vision  ENT: negative  Resp: No shortness of breath and No cough  CV: negative for, chest pain and lower extremity edema  GI: negative for, poor appetite, nausea and vomiting  : negative  Musculoskeletal: negative for and joint swelling  Neurologic: positive for as above, stroke, local weakness, speech problems and incoordination  Psychiatric:  negative for, anxiety and depression  Endocrine: negative for and diabetes    OBJECTIVE:  Vitals:    06/23/17 0945   BP: 130/74   Pulse: 89   Resp: 19   Temp: 98.4  F (36.9  C)   TempSrc: Tympanic   SpO2: 98%   Weight: 165 lb (74.8 kg)     GENERAL APPEARANCE: healthy, alert and no distress  EYES: EOMI, fundi benign- PERRL  NECK: no adenopathy, no asymmetry, masses, or scars and thyroid normal to palpation  RESP: lungs clear to auscultation - no rales, rhonchi or wheezes  CV: regular rates and rhythm, normal S1 S2, no S3 or S4 and no murmur, click or rub -  ABDOMEN:  soft, nontender, no HSM or masses and bowel sounds normal  MS: extremities normal- no gross deformities noted, no evidence of inflammation in joints, FROM in all extremities.  SKIN: no suspicious lesions or rashes  NEURO: Normal strength and tone, sensory exam grossly normal, mentation intact, speech normal, cranial nerves 2-12 intact, Romberg negative and proprioception normal  PSYCH: mentation appears normal. and affect normal/bright    ASSESSMENT/ORDERS:    ICD-10-CM    1. Left sided cerebral hemisphere cerebrovascular accident (H) I63.9 clopidogrel (PLAVIX) 75 MG tablet   2. Left carotid artery occlusion I65.22    3. ACP (advance care planning) Z71.89      PLAN:  Overall, patient is doing remarkably well.  All services in place - OT, speech, neurology.  Supportive family.  Taking medications as directed - Plavix refilled today.  See below.    Patient Instructions   Continue both the Lipitor and the Plavix.  Suspect neurology will recommend higher dose of Lipitor.  Follow through with neurology consult on 6/30 with Dr. Cynthia Aleman.  Continue OT and speech therapy.  Keep hydrated.  Rest - do not over do it!        Theodora Thornton

## 2017-06-23 NOTE — NURSING NOTE
"Chief Complaint   Patient presents with     Establish Care     RECHECK     ER follow up 6/8/17      *_* Health Care Directive *_*     has one at home, will bring it in to be scanned       Initial /74  Pulse 89  Temp 98.4  F (36.9  C) (Tympanic)  Resp 19  Wt 165 lb (74.8 kg)  SpO2 98%  Breastfeeding? No  BMI 27.46 kg/m2 Estimated body mass index is 27.46 kg/(m^2) as calculated from the following:    Height as of 6/8/17: 5' 5\" (1.651 m).    Weight as of this encounter: 165 lb (74.8 kg).  Medication Reconciliation: complete   Tessie Quevedo      "

## 2017-06-23 NOTE — PATIENT INSTRUCTIONS
Continue both the Lipitor and the Plavix.  Suspect neurology will recommend higher dose of Lipitor.  Follow through with neurology consult on 6/30 with Dr. Cynthia Aleman.  Continue OT and speech therapy.  Keep hydrated.  Rest - do not over do it!

## 2017-06-24 ASSESSMENT — ANXIETY QUESTIONNAIRES: GAD7 TOTAL SCORE: 0

## 2017-06-24 ASSESSMENT — PATIENT HEALTH QUESTIONNAIRE - PHQ9: SUM OF ALL RESPONSES TO PHQ QUESTIONS 1-9: 0

## 2017-06-30 ENCOUNTER — TRANSFERRED RECORDS (OUTPATIENT)
Dept: HEALTH INFORMATION MANAGEMENT | Facility: HOSPITAL | Age: 72
End: 2017-06-30

## 2017-06-30 LAB
CHOLEST SERPL-MCNC: 135 MG/DL
HDLC SERPL-MCNC: 56 MG/DL
LDLC SERPL CALC-MCNC: 55 MG/DL
TRIGL SERPL-MCNC: 119 MG/DL

## 2017-07-30 ENCOUNTER — HOSPITAL ENCOUNTER (EMERGENCY)
Facility: HOSPITAL | Age: 72
Discharge: HOME | End: 2017-07-30
Attending: NURSE PRACTITIONER | Admitting: NURSE PRACTITIONER
Payer: MEDICARE

## 2017-07-30 VITALS
SYSTOLIC BLOOD PRESSURE: 136 MMHG | RESPIRATION RATE: 18 BRPM | HEART RATE: 114 BPM | DIASTOLIC BLOOD PRESSURE: 98 MMHG | OXYGEN SATURATION: 95 % | TEMPERATURE: 99.8 F

## 2017-07-30 DIAGNOSIS — N39.41 URGE INCONTINENCE OF URINE: ICD-10-CM

## 2017-07-30 LAB
ALBUMIN UR-MCNC: NEGATIVE MG/DL
APPEARANCE UR: CLEAR
BACTERIA #/AREA URNS HPF: ABNORMAL /HPF
BILIRUB UR QL STRIP: NEGATIVE
COLOR UR AUTO: YELLOW
GLUCOSE UR STRIP-MCNC: 30 MG/DL
HGB UR QL STRIP: NEGATIVE
KETONES UR STRIP-MCNC: NEGATIVE MG/DL
LEUKOCYTE ESTERASE UR QL STRIP: NEGATIVE
MUCOUS THREADS #/AREA URNS LPF: PRESENT /LPF
NITRATE UR QL: NEGATIVE
PH UR STRIP: 7.5 PH (ref 4.7–8)
RBC #/AREA URNS AUTO: 1 /HPF (ref 0–2)
SP GR UR STRIP: 1.01 (ref 1–1.03)
URN SPEC COLLECT METH UR: ABNORMAL
UROBILINOGEN UR STRIP-MCNC: NORMAL MG/DL (ref 0–2)
WBC #/AREA URNS AUTO: <1 /HPF (ref 0–2)

## 2017-07-30 PROCEDURE — 81001 URINALYSIS AUTO W/SCOPE: CPT | Performed by: FAMILY MEDICINE

## 2017-07-30 PROCEDURE — 99213 OFFICE O/P EST LOW 20 MIN: CPT

## 2017-07-30 PROCEDURE — 99213 OFFICE O/P EST LOW 20 MIN: CPT | Performed by: NURSE PRACTITIONER

## 2017-07-30 ASSESSMENT — ENCOUNTER SYMPTOMS
ACTIVITY CHANGE: 0
FEVER: 0
APPETITE CHANGE: 0

## 2017-07-30 NOTE — ED PROVIDER NOTES
History     Chief Complaint   Patient presents with     Rule out Urinary Tract Infection     urinary frequency and urinary incontinence     The history is provided by the patient. No  was used.     Emily Olson is a 72 year old female who presents with some urinary urgency and frequency, incontinence worse for the the past one day. She has not had any dysuria , hematuria,no fever, no shaking chills. She had a stroke 8 weeks ago . She has been working on her recovery since with PT.  She admits that she has had some urgency and incontinence occasionally since the stroke however this seemed worse last night.  She has seen OB/GYN for the urge incontinence and had been working on Kegel's  .  She does not feel sick.  She admits to being easily fatigued since she had the stroke and there is no change in that.  Her  describes it as weakness. With further discussion it seems that she is easily fatigued . This is not new. She did drink a can of boost prior to coming to the .     I have reviewed the Medications, Allergies, Past Medical and Surgical History, and Social History in the Epic system.    Allergies:   Allergies   Allergen Reactions     Animal Dander      Cat and dog     Dust Mites Other (See Comments)     House Dust       Grass Other (See Comments)     Grass Poll-Perennial Rye, STD       Sulfa Drugs          No current facility-administered medications on file prior to encounter.   Current Outpatient Prescriptions on File Prior to Encounter:  clopidogrel (PLAVIX) 75 MG tablet Take 1 tablet (75 mg) by mouth daily   omega-3 acid ethyl esters (LOVAZA) 1 G capsule Take 1,500 g by mouth daily   calcium-vitamin D (CALTRATE) 600-400 MG-UNIT per tablet Take 1 tablet by mouth daily   OMEPRAZOLE PO Take 20 mg by mouth every morning   atorvastatin (LIPITOR) 10 MG tablet Take 1 tablet (10 mg) by mouth daily   aspirin 81 MG chewable tablet 2 times daily Chew 1 tablet (81mg) by oral route every  "day   Multiple Vitamin (DAILY MULTIVITAMIN PO) Take 1 tablet by oral route every day with food       Patient Active Problem List   Diagnosis     Bilateral lower extremity edema     Trochanteric bursitis, right hip     Urge incontinence     Pulmonary nodule     GERD (gastroesophageal reflux disease)     Osteoporosis     Hyperlipidemia, unspecified     ACP (advance care planning)     Left carotid artery occlusion     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    BMI: Estimated body mass index is 27.46 kg/(m^2) as calculated from the following:    Height as of 6/8/17: 1.651 m (5' 5\").    Weight as of 6/23/17: 74.8 kg (165 lb).        Review of Systems   Constitutional: Negative for activity change, appetite change and fever.   Eyes: Negative.    Respiratory: Negative.    Cardiovascular: Negative.    Gastrointestinal: Negative.  Negative for diarrhea, nausea and vomiting.   Genitourinary: Positive for frequency and urgency. Negative for decreased urine volume, difficulty urinating, dysuria, flank pain and hematuria.   Musculoskeletal: Negative.    Skin: Negative.        Physical Exam   BP: 136/98  Pulse: 114  Temp: 99.8  F (37.7  C)  Resp: 18  SpO2: 95 %  Physical Exam   Constitutional: She is oriented to person, place, and time. She appears well-developed and well-nourished. No distress.   Well groomed, non toxic appearing   HENT:   Head: Normocephalic and atraumatic.   Eyes: Conjunctivae are normal. Right eye exhibits no discharge. Left eye exhibits no discharge.   Neck: Normal range of motion.   Cardiovascular: Normal rate and normal heart sounds.  "   Pulmonary/Chest: Effort normal.   Abdominal: Soft. Bowel sounds are normal. She exhibits no mass. There is no tenderness. There is no rebound and no guarding.   No CVA tenderness   Musculoskeletal: Normal range of motion.   Neurological: She is alert and oriented to person, place, and time.   Skin: Skin is warm and dry. No rash noted. She is not diaphoretic.   Nursing note and vitals reviewed.      ED Course     ED Course     Procedures               Labs Ordered and Resulted from Time of ED Arrival Up to the Time of Departure from the ED   ROUTINE UA WITH MICROSCOPIC - Abnormal; Notable for the following:        Result Value    Glucose Urine 30 (*)     Bacteria Urine None (*)     Mucous Urine Present (*)     All other components within normal limits       Assessments & Plan (with Medical Decision Making)     I have reviewed the nursing notes.  This is likely an urge incontinence.  No signs of infection and UA is normal.  Advise resume kegel's, monitor for any worsening sx and attends for night time.  She is stressed about getting to the BR and did not sleep well last night.  If there are any worsening sx such as dysuria, flank pain, fever return here or  PCP immediately     Pathophysiology, possible etiology and treatment with potential outcomes, risks, benefits, and alternatives discussed to the best of my ability        I have reviewed the findings, diagnosis, plan and need for follow up with the patient.  Pt verbalizes understanding and agreement with plan.  Follow up for worsening symptoms      Discharge Medication List as of 7/30/2017  9:33 AM          Final diagnoses:   Urge incontinence of urine       7/30/2017   [facility]     Aaliyah Cardenas NP  08/01/17 9840

## 2017-07-30 NOTE — DISCHARGE INSTRUCTIONS
Treating Incontinence in Women: Nonsurgical Methods    The best treatment for you will depend on the type of incontinence you have. Your symptoms, age, and any underlying problems that are found also affect your treatment. While some types of incontinence may eventually require surgery, nonsurgical treatments may be effective in many cases. Nonsurgical treatments include lifestyle changes, muscle-strengthening exercises, and medicines.  Nonsurgical Treatments  Treatment for stress urinary incontinence includes:    Bladder training    Lifestyle changes such as weight loss and increased activity if incontinence is due to being overweight    Medicines, if bladder training has not helped    Pelvic floor muscle exercises  Lifestyle changes    Losing weight. Excess weight puts extra pressure on the pelvic floor muscles. Exercising and eating right can help you lose weight. This helps other treatments work better.    Making certain diet changes. Some foods may make you need to urinate more, so it may be good to avoid them. These include caffeinated drinks and alcohol. Ask your healthcare provider whether these or other diet changes might be helpful.    Quitting smoking. Smoking can lead to a chronic cough that strains pelvic floor muscles. Smoking may also damage the bladder and urethra.  Pelvic floor musle exercises  There are exercises you can do to help strengthen your pelvic floor muscles. The pelvic floor muscles act as a sling to help hold the bladder and urethra in place. These muscles also help keep the urethra closed. Weak pelvic floor muscles may allow urine to leak. To strengthen the pelvic floor muscles, do the exercises daily. In a few months, the muscles will be stronger and tighter. This can help prevent urine leakage.  Date Last Reviewed: 1/1/2017 2000-2017 [employer]. [address] [place] [address], [place] [address]. All rights reserved. This information is not intended as a substitute for  professional medical care. Always follow your healthcare professional's instructions.          Kegel Exercises  Kegel exercises don t need special clothing or equipment. They re easy to learn and simple to do. And if you do them right, no one can tell you re doing them, so they can be done almost anywhere. Your healthcare provider, nurse, or physical therapist can answer any questions you have and help you get started.    A weak pelvic floor   The pelvic floor muscles may weaken due to aging, pregnancy and vaginal childbirth, injury, surgery, chronic cough, or lack of exercise. If the pelvic floor is weak, your bladder and other pelvic organs may sag out of place. The urethra may also open too easily and allow urine to leak out. Kegel exercises can help you strengthen your pelvic floor muscles. Then they can better support the pelvic organs and control urine flow.  How Kegel exercises are done  Try each of the Kegel exercises described below. When you re doing them, try not to move your leg, buttock, or stomach muscles.    Contract as if you were stopping your urine stream. But do it when you re not urinating.    Tighten your rectum as if trying not to pass gas. Contract your anus, but don t move your buttocks.    You may place a finger or 2 in the vagina and squeeze your finger with your vagina to learn which muscles to tighten.  Try to hold each Kegel for a slow count to 5. You probably won t be able to hold them for that long at first. But keep practicing. It will get easier as your pelvic floor gets stronger. Eventually, special weights that you place in your vagina may be recommended to help make your Kegels even more effective. Visit your healthcare provider if you have difficulties doing Kegel exercises.  Helpful hints  Here are some tips to follow:    Do your Kegels as often as you can. The more you do them, the faster you ll feel the results.    Pick an activity you do often as a reminder. For instance, do  your Kegels every time you sit down.    Tighten your pelvic floor before you sneeze, get up from a chair, cough, laugh, or lift. This protects your pelvic floor from injury and can help prevent urine leakage.   Date Last Reviewed: 8/5/2015 2000-2017 [employer]. [address] [place] [address], [place] [address]. All rights reserved. This information is not intended as a substitute for professional medical care. Always follow your healthcare professional's instructions.

## 2017-07-30 NOTE — ED AVS SNAPSHOT
[facility]    [address]    [facility] [place] [address]    Phone:  [phone]                                       Emily Olson   MRN: [id number]    Department[facility]  [facility]   Date of Visit:  7/30/2017           Patient Information     Date Of Birth          1945        Your diagnoses for this visit were:     Urge incontinence of urine        You were seen by Aaliyah Cardenas NP.      Follow-up Information     Follow up with Theodora Lora MD.    Specialty:  Family Practice    Why:  As needed, If symptoms worsen    Contact information:    [facility]  [address]  [place] [address] [phone]          Follow up with [facility].    Specialty:  EMERGENCY MEDICINE    Why:  As needed, If symptoms worsen    Contact information:    [address]  [place] [address] [phone]    Additional information:    From [place]: Take [address]. Turn left at [address]. Turn left at the first stoplight on [address]. At the first stop sign, take a right onto [address]. Take a left into the parking lot and continue through until you reach the North enterance of the building.       From [place]: Take [address]. Take the [address] ramp towards [place]. Turn left onto [address]. Take a slight right onto [address] [facility]. Turn left at the first stoplight on [address]. At the first stop sign, take a right onto [address]. Take a left into the parking lot and continue through until you reach the North enterance of the building.       From [place]: Take [address]. Take a right at [address]. At the first stop sign, take a right onto [address]. Take a left into the parking lot and continue through until you reach the North enterance of the building.         Discharge Instructions         Treating Incontinence in Women: Nonsurgical Methods    The best  treatment for you will depend on the type of incontinence you have. Your symptoms, age, and any underlying problems that are found also affect your treatment. While some types of incontinence may eventually require surgery, nonsurgical treatments may be effective in many cases. Nonsurgical treatments include lifestyle changes, muscle-strengthening exercises, and medicines.  Nonsurgical Treatments  Treatment for stress urinary incontinence includes:    Bladder training    Lifestyle changes such as weight loss and increased activity if incontinence is due to being overweight    Medicines, if bladder training has not helped    Pelvic floor muscle exercises  Lifestyle changes    Losing weight. Excess weight puts extra pressure on the pelvic floor muscles. Exercising and eating right can help you lose weight. This helps other treatments work better.    Making certain diet changes. Some foods may make you need to urinate more, so it may be good to avoid them. These include caffeinated drinks and alcohol. Ask your healthcare provider whether these or other diet changes might be helpful.    Quitting smoking. Smoking can lead to a chronic cough that strains pelvic floor muscles. Smoking may also damage the bladder and urethra.  Pelvic floor musle exercises  There are exercises you can do to help strengthen your pelvic floor muscles. The pelvic floor muscles act as a sling to help hold the bladder and urethra in place. These muscles also help keep the urethra closed. Weak pelvic floor muscles may allow urine to leak. To strengthen the pelvic floor muscles, do the exercises daily. In a few months, the muscles will be stronger and tighter. This can help prevent urine leakage.  Date Last Reviewed: 1/1/2017 2000-2017 [employer]. [address] [place] [address], [place] [address]. All rights reserved. This information is not intended as a substitute for professional medical care. Always follow your healthcare  professional's instructions.          Kegel Exercises  Kegel exercises don t need special clothing or equipment. They re easy to learn and simple to do. And if you do them right, no one can tell you re doing them, so they can be done almost anywhere. Your healthcare provider, nurse, or physical therapist can answer any questions you have and help you get started.    A weak pelvic floor   The pelvic floor muscles may weaken due to aging, pregnancy and vaginal childbirth, injury, surgery, chronic cough, or lack of exercise. If the pelvic floor is weak, your bladder and other pelvic organs may sag out of place. The urethra may also open too easily and allow urine to leak out. Kegel exercises can help you strengthen your pelvic floor muscles. Then they can better support the pelvic organs and control urine flow.  How Kegel exercises are done  Try each of the Kegel exercises described below. When you re doing them, try not to move your leg, buttock, or stomach muscles.    Contract as if you were stopping your urine stream. But do it when you re not urinating.    Tighten your rectum as if trying not to pass gas. Contract your anus, but don t move your buttocks.    You may place a finger or 2 in the vagina and squeeze your finger with your vagina to learn which muscles to tighten.  Try to hold each Kegel for a slow count to 5. You probably won t be able to hold them for that long at first. But keep practicing. It will get easier as your pelvic floor gets stronger. Eventually, special weights that you place in your vagina may be recommended to help make your Kegels even more effective. Visit your healthcare provider if you have difficulties doing Kegel exercises.  Helpful hints  Here are some tips to follow:    Do your Kegels as often as you can. The more you do them, the faster you ll feel the results.    Pick an activity you do often as a reminder. For instance, do your Kegels every time you sit down.    Tighten your  pelvic floor before you sneeze, get up from a chair, cough, laugh, or lift. This protects your pelvic floor from injury and can help prevent urine leakage.   Date Last Reviewed: 8/5/2015 2000-2017 [employer]. [address] [place] [address], [place] [address]. All rights reserved. This information is not intended as a substitute for professional medical care. Always follow your healthcare professional's instructions.             Review of your medicines      Our records show that you are taking the medicines listed below. If these are incorrect, please call your family doctor or clinic.        Dose / Directions Last dose taken    aspirin 81 MG chewable tablet        2 times daily Chew 1 tablet (81mg) by oral route every day   Refills:  0        atorvastatin 10 MG tablet   Commonly known as:  LIPITOR   Dose:  10 mg   Quantity:  30 tablet        Take 1 tablet (10 mg) by mouth daily   Refills:  1        calcium-vitamin D 600-400 MG-UNIT per tablet   Commonly known as:  CALTRATE   Dose:  1 tablet        Take 1 tablet by mouth daily   Refills:  0        clopidogrel 75 MG tablet   Commonly known as:  PLAVIX   Dose:  75 mg   Quantity:  30 tablet        Take 1 tablet (75 mg) by mouth daily   Refills:  3        DAILY MULTIVITAMIN PO        Take 1 tablet by oral route every day with food   Refills:  0        omega-3 acid ethyl esters 1 G capsule   Commonly known as:  Lovaza   Dose:  1500 g        Take 1,500 g by mouth daily   Refills:  0        OMEPRAZOLE PO   Dose:  20 mg        Take 20 mg by mouth every morning   Refills:  0                Procedures and tests performed during your visit     Routine UA with microscopic      Orders Needing Specimen Collection     None      Pending Results     No orders found from 7/28/2017 to 7/31/2017.            Pending Culture Results     No orders found from 7/28/2017 to 7/31/2017.            Thank you for choosing [facility]       Thank you for choosing [facility] for your care.  "Our goal is always to provide you with excellent care. Hearing back from our patients is one way we can continue to improve our services. Please take a few minutes to complete the written survey that you may receive in the mail after you visit with us. Thank you!        [employer] Information     [employer] lets you send messages to your doctor, view your test results, renew your prescriptions, schedule appointments and more. To sign up, go to [email or web address] . Click on \"Log in\" on the left side of the screen, which will take you to the Welcome page. Then click on \"Sign up Now\" on the right side of the page.     You will be asked to enter the access code listed below, as well as some personal information. Please follow the directions to create your username and password.     Your access code is: [email or web address]  Expires: 2017 10:03 PM     Your access code will  in 90 days. If you need help or a new code, please call your [facility] clinic or [phone].        Care EveryWhere ID     This is your Care EveryWhere ID. This could be used by other organizations to access your [facility] medical records  [id number]        Equal Access to Services     ALLI PHILIP : Gabriela [place], sadi brown, christin cash, maurilio jarrell. So [facility] [phone].    ATENCIÓN: Si habla español, tiene a torres disposición servicios gratuitos de asistencia lingüística. Yohan al [phone].    We comply with applicable federal civil rights laws and [place] laws. We do not discriminate on the basis of race, color, national origin, age, disability sex, sexual orientation or gender identity.            After Visit Summary       This is your record. Keep this with you and show to your community pharmacist(s) and doctor(s) at your next visit.                  "

## 2017-07-30 NOTE — ED NOTES
C/o urinary frequency and urinary incontinence and expresses concern over possible UTI. Daughter with patient and states they also have some questions about patient's medication as she is prescribed omeprazole and lipitor and her research has shown they shouldn't be taken together. Explained to daughter and patient that those types of questions are best to discuss with the primary MD as he knows patient's medical history best and most likely prescribed medications knowing that and has a reason for such but that is why it's best to discuss that with primary. Did inform them they can still ask the provider any questions they would like. Daughter and patient verbalized understanding. They also stated they did not want to be seen in ER and did want to wait for Urgent Care to open as they are just wanting to have patient checked for a UTI. Urine cup and cleansing wipes given to patient and instructed on use.

## 2017-07-30 NOTE — ED AVS SNAPSHOT
[facility]    [address] [address]    Phone:  [phone]                                       Emily Olson   MRN: [id number]    [facility]  [facility]   Date of Visit:  7/30/2017           After Visit Summary Signature Page     I have received my discharge instructions, and my questions have been answered. I have discussed any challenges I see with this plan with the nurse or doctor.    ..........................................................................................................................................  Patient/Patient Representative Signature      ..........................................................................................................................................  Patient Representative Print Name and Relationship to Patient    ..................................................               ................................................  Date                                            Time    ..........................................................................................................................................  Reviewed by Signature/Title    ...................................................              ..............................................  Date                                                            Time

## 2017-08-01 ASSESSMENT — ENCOUNTER SYMPTOMS
DIARRHEA: 0
HEMATURIA: 0
GASTROINTESTINAL NEGATIVE: 1
DYSURIA: 0
FREQUENCY: 1
EYES NEGATIVE: 1
RESPIRATORY NEGATIVE: 1
FLANK PAIN: 0
MUSCULOSKELETAL NEGATIVE: 1
NAUSEA: 0
DIFFICULTY URINATING: 0
VOMITING: 0
CARDIOVASCULAR NEGATIVE: 1

## 2017-08-30 ENCOUNTER — HOSPITAL ENCOUNTER (EMERGENCY)
Facility: HOSPITAL | Age: 72
Discharge: HOME | End: 2017-08-30
Attending: NURSE PRACTITIONER | Admitting: NURSE PRACTITIONER
Payer: MEDICARE

## 2017-08-30 ENCOUNTER — TELEPHONE (OUTPATIENT)
Dept: FAMILY MEDICINE | Facility: OTHER | Age: 72
End: 2017-08-30

## 2017-08-30 VITALS
OXYGEN SATURATION: 95 % | SYSTOLIC BLOOD PRESSURE: 166 MMHG | RESPIRATION RATE: 16 BRPM | DIASTOLIC BLOOD PRESSURE: 88 MMHG | TEMPERATURE: 97.6 F

## 2017-08-30 DIAGNOSIS — R53.83 OTHER FATIGUE: ICD-10-CM

## 2017-08-30 PROCEDURE — 99212 OFFICE O/P EST SF 10 MIN: CPT

## 2017-08-30 PROCEDURE — 99213 OFFICE O/P EST LOW 20 MIN: CPT | Performed by: NURSE PRACTITIONER

## 2017-08-30 ASSESSMENT — ENCOUNTER SYMPTOMS
FEVER: 0
CONSTITUTIONAL NEGATIVE: 1
NUMBNESS: 0
EYES NEGATIVE: 1
PSYCHIATRIC NEGATIVE: 1
WEAKNESS: 0
RESPIRATORY NEGATIVE: 1
LIGHT-HEADEDNESS: 0
HEADACHES: 0
FACIAL ASYMMETRY: 0
ACTIVITY CHANGE: 0
SPEECH DIFFICULTY: 0
MYALGIAS: 1
CARDIOVASCULAR NEGATIVE: 1
DIZZINESS: 0

## 2017-08-30 NOTE — TELEPHONE ENCOUNTER
9:13 AM    Reason for Call: OVERBOOK    Patient is having the following symptoms: PT said she would like to be seen today, she said she had a stroke and that she thinks this has something to do with her lipitor and plavix. Please call her back at [phone]    The patient is requesting an appointment for today with PCP Dr. Theodora Lora.    Was an appointment offered for this call? No, nothing available today  If yes : Appointment type              Date    Preferred method for responding to this message: Telephone Call [phone]  What is your phone number ?    If we cannot reach you directly, may we leave a detailed response at the number you provided? Yes    Can this message wait until your PCP/provider returns, if unavailable today? PCP is in    Georgie Lewis

## 2017-08-30 NOTE — ED AVS SNAPSHOT
[facility]    [address] [address]    Phone:  [phone]                                       Emily Olson   MRN: [id number]    [facility]   Date of Visit:  8/30/2017           After Visit Summary Signature Page     I have received my discharge instructions, and my questions have been answered. I have discussed any challenges I see with this plan with the nurse or doctor.    ..........................................................................................................................................  Patient/Patient Representative Signature      ..........................................................................................................................................  Patient Representative Print Name and Relationship to Patient    ..................................................               ................................................  Date                                            Time    ..........................................................................................................................................  Reviewed by Signature/Title    ...................................................              ..............................................  Date                                                            Time

## 2017-08-30 NOTE — ED AVS SNAPSHOT
[facility]    [address] [address]    Phone:  [phone]                                       Emily Olson   MRN: [id number]    [facility]  [facility]   Date of Visit:  8/30/2017           Patient Information     Date Of Birth          1945        Your diagnoses for this visit were:     Other fatigue        You were seen by Marti Beckford NP and Aaliyah Cardenas NP.      Follow-up Information     Schedule an appointment as soon as possible for a visit with Theodora Lora MD.    Specialty:  Family Practice    Contact information:    [facility]  [address] [place] [address] [phone]          Follow up with [facility].    Specialty:  EMERGENCY MEDICINE    Why:  As needed, If symptoms worsen    Contact information:    [address] [address] [phone]    Additional information:    From [facility]: Take [address]. Turn left at [address]. Turn left at the first stoplight on [address]. At the first stop sign, take a right onto [address]. Take a left into the parking lot and continue through until you reach the North enterance of the building.       From [place]: Take [address]. Take the [address] ramp towards [place]. Turn left onto [address]. Take a slight right onto [address]. Turn left at the first stoplight on [address]. At the first stop sign, take a right onto [address]. Take a left into the parking lot and continue through until you reach the North enterance of the building.       From [place]: Take [address]. Take a right at [address]. At the first stop sign, take a right onto [address]. Take a left into the parking lot and continue through until you reach the North enterance of the building.       Discharge References/Attachments     FATIGUE, MANAGING (ENGLISH)         Review  "of your medicines      Our records show that you are taking the medicines listed below. If these are incorrect, please call your family doctor or clinic.        Dose / Directions Last dose taken    aspirin 81 MG chewable tablet        2 times daily Chew 1 tablet (81mg) by oral route every day   Refills:  0        atorvastatin 10 MG tablet   Commonly known as:  LIPITOR   Dose:  10 mg   Quantity:  30 tablet        Take 1 tablet (10 mg) by mouth daily   Refills:  1        calcium-vitamin D 600-400 MG-UNIT per tablet   Commonly known as:  CALTRATE   Dose:  1 tablet        Take 1 tablet by mouth daily   Refills:  0        clopidogrel 75 MG tablet   Commonly known as:  PLAVIX   Dose:  75 mg   Quantity:  30 tablet        Take 1 tablet (75 mg) by mouth daily   Refills:  3        DAILY MULTIVITAMIN PO        Take 1 tablet by oral route every day with food   Refills:  0        omega-3 acid ethyl esters 1 G capsule   Commonly known as:  Lovaza   Dose:  1500 g        Take 1,500 g by mouth daily   Refills:  0        OMEPRAZOLE PO   Dose:  20 mg        Take 20 mg by mouth every morning   Refills:  0                Orders Needing Specimen Collection     None      Pending Results     No orders found from 8/28/2017 to 8/31/2017.            Pending Culture Results     No orders found from 8/28/2017 to 8/31/2017.            Thank you for choosing [facility]       Thank you for choosing [facility] for your care. Our goal is always to provide you with excellent care. Hearing back from our patients is one way we can continue to improve our services. Please take a few minutes to complete the written survey that you may receive in the mail after you visit with us. Thank you!        [employer] Information     [facility] lets you send messages to your doctor, view your test results, renew your prescriptions, schedule appointments and more. To sign up, go to [email or web address] . Click on \"Log in\" on the left side of the screen, which will take " "you to the Welcome page. Then click on \"Sign up Now\" on the right side of the page.     You will be asked to enter the access code listed below, as well as some personal information. Please follow the directions to create your username and password.     Your access code is: [email or web address]  Expires: 2017 10:03 PM     Your access code will  in 90 days. If you need help or a new code, please call your [facility] clinic or [phone].        Care EveryWhere ID     This is your Care EveryWhere ID. This could be used by other organizations to access your [facility] medical records  [id number]        Equal Access to Services     ALLI PHILIP : Gabriela [place], sadi brown, christin cash, maurilio jarrell. So [facility] [phone].    ATENCIÓN: Si habla español, tiene a torres disposición servicios gratuitos de asistencia lingüística. Llame al [phone].    We comply with applicable federal civil rights laws and [place] laws. We do not discriminate on the basis of race, color, national origin, age, disability sex, sexual orientation or gender identity.            After Visit Summary       This is your record. Keep this with you and show to your community pharmacist(s) and doctor(s) at your next visit.                  "

## 2017-08-30 NOTE — ED NOTES
Pt presents with pain to both hips and to entire body, tired , listless, problems with focusing and shakey. Has had these sx for 1 month.

## 2017-08-30 NOTE — ED PROVIDER NOTES
"  History     Chief Complaint   Patient presents with     generalized pain     c/o pain all over notes hips hurt with walking, concerned due to thinks its a side effect of her lipitor. notes had a stroke june 4 and since that time has not been able to write as well and gets shakey at times     The history is provided by the patient and the spouse. No  was used.     Emily Olson is a 72 year old female who presents with weak, shaky and tired feeling after she works hard or does activity.  She notes this has been occurring for a month or longer.  She also reports that for a month she has felt generalized myalgias which also get worse when she is tired.  No fever, appetite is slightly decreased, but she is eating adequately.  She has had all these sx for a month or longer likely longer  No word finding issues,no speech issues. She does feel better when she rests  She reports that it does get better when she rests.  Her  states she does not like to rest she likes to go,go, go.   She has completed a course of both PT and OT and she \"graduated\" after her stroke.     She has not had signs of systemic illness       She has concerns about her lipitor possibly causing myalgias for her.    I have reviewed the Medications, Allergies, Past Medical and Surgical History, and Social History in the Epic system.    Allergies:   Allergies   Allergen Reactions     Animal Dander      Cat and dog     Dust Mites Other (See Comments)     House Dust       Grass Other (See Comments)     Grass Poll-Perennial R[place], STD       Sulfa Drugs          No current facility-administered medications on file prior to encounter.   Current Outpatient Prescriptions on File Prior to Encounter:  clopidogrel (PLAVIX) 75 MG tablet Take 1 tablet (75 mg) by mouth daily   omega-3 acid ethyl esters (LOVAZA) 1 G capsule Take 1,500 g by mouth daily   calcium-vitamin D (CALTRATE) 600-400 MG-UNIT per tablet Take 1 tablet by mouth daily " "  atorvastatin (LIPITOR) 10 MG tablet Take 1 tablet (10 mg) by mouth daily   aspirin 81 MG chewable tablet 2 times daily Chew 1 tablet (81mg) by oral route every day   Multiple Vitamin (DAILY MULTIVITAMIN PO) Take 1 tablet by oral route every day with food   OMEPRAZOLE PO Take 20 mg by mouth every morning       Patient Active Problem List   Diagnosis     Bilateral lower extremity edema     Trochanteric bursitis, right hip     Urge incontinence     Pulmonary nodule     GERD (gastroesophageal reflux disease)     Osteoporosis     Hyperlipidemia, unspecified     ACP (advance care planning)     Left carotid artery occlusion     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    BMI: Estimated body mass index is 27.46 kg/(m^2) as calculated from the following:    Height as of 6/8/17: 1.651 m (5' 5\").    Weight as of 6/23/17: 74.8 kg (165 lb).      Review of Systems   Constitutional: Negative.  Negative for activity change and fever.   HENT: Negative.    Eyes: Negative.    Respiratory: Negative.    Cardiovascular: Negative.    Genitourinary: Negative.  Negative for decreased urine volume.        Has had some urinary incontinence at baseline.  She reports that has improved with the \"kegels\"   Musculoskeletal: Positive for myalgias (generalized after she has been working \"hard\"). Negative for gait problem.   Skin: Negative.    Neurological: Negative for dizziness, facial asymmetry, speech difficulty, weakness, light-headedness, numbness and headaches.   Psychiatric/Behavioral: Negative.        Physical Exam   BP: 166/88  Heart Rate: 80  Temp: 97.6  F (36.4  C)  Resp: " 16  SpO2: 95 %  Physical Exam   Constitutional: She is oriented to person, place, and time. She appears well-developed and well-nourished. No distress.   HENT:   Head: Normocephalic and atraumatic.   Mouth/Throat: Oropharynx is clear and moist. No oropharyngeal exudate.   Eyes: Conjunctivae are normal. Right eye exhibits no discharge. Left eye exhibits no discharge.   Neck: Normal range of motion.   Cardiovascular: Normal rate, regular rhythm and normal heart sounds.  Exam reveals no gallop and no friction rub.    No murmur heard.  Pulmonary/Chest: Effort normal and breath sounds normal. She has no wheezes. She has no rales.   Abdominal: Soft. Bowel sounds are normal.   Musculoskeletal: Normal range of motion. She exhibits no edema, tenderness or deformity.   Strength is good t/o and symetrical   Neurological: She is alert and oriented to person, place, and time. She has normal reflexes. No cranial nerve deficit. She exhibits normal muscle tone. Coordination normal.   Skin: Skin is warm and dry. No rash noted. She is not diaphoretic.   Nursing note and vitals reviewed.      ED Course     ED Course     Procedures               Labs Ordered and Resulted from Time of ED Arrival Up to the Time of Departure from the ED - No data to display    Assessments & Plan (with Medical Decision Making)     I have reviewed the nursing notes.  Lengthy discussion with pt and . It seems that she always  feels better after resting. Would advise taking frequent rest breaks and advised making an appointment with Dr Lora to address concerns.    There may be an anxiety component here as well.     I have reviewed the findings, diagnosis, plan and need for follow up with the patient and the  they are comfortable with the plan      Discharge Medication List as of 8/30/2017 12:24 PM          Final diagnoses:   Other fatigue       8/30/2017   [facility]     Aaliyah Cardenas NP  08/30/17 2022

## 2017-08-30 NOTE — TELEPHONE ENCOUNTER
Patient states just not feeling right and worried about stroke - instructed to go to the ER to be evaluated

## 2017-09-25 ENCOUNTER — TRANSFERRED RECORDS (OUTPATIENT)
Dept: HEALTH INFORMATION MANAGEMENT | Facility: HOSPITAL | Age: 72
End: 2017-09-25

## 2017-09-26 ENCOUNTER — TRANSFERRED RECORDS (OUTPATIENT)
Dept: HEALTH INFORMATION MANAGEMENT | Facility: HOSPITAL | Age: 72
End: 2017-09-26

## 2017-10-25 ENCOUNTER — TRANSFERRED RECORDS (OUTPATIENT)
Dept: HEALTH INFORMATION MANAGEMENT | Facility: HOSPITAL | Age: 72
End: 2017-10-25

## 2017-11-07 ENCOUNTER — APPOINTMENT (OUTPATIENT)
Dept: GENERAL RADIOLOGY | Facility: HOSPITAL | Age: 72
End: 2017-11-07
Attending: FAMILY MEDICINE
Payer: MEDICARE

## 2017-11-07 ENCOUNTER — TELEPHONE (OUTPATIENT)
Dept: FAMILY MEDICINE | Facility: OTHER | Age: 72
End: 2017-11-07

## 2017-11-07 ENCOUNTER — HOSPITAL ENCOUNTER (EMERGENCY)
Facility: HOSPITAL | Age: 72
Discharge: HOME | End: 2017-11-07
Attending: FAMILY MEDICINE | Admitting: FAMILY MEDICINE
Payer: MEDICARE

## 2017-11-07 VITALS
HEIGHT: 67 IN | RESPIRATION RATE: 16 BRPM | SYSTOLIC BLOOD PRESSURE: 173 MMHG | OXYGEN SATURATION: 97 % | TEMPERATURE: 98 F | DIASTOLIC BLOOD PRESSURE: 95 MMHG

## 2017-11-07 DIAGNOSIS — R04.2 HEMOPTYSIS: ICD-10-CM

## 2017-11-07 LAB
BASOPHILS # BLD AUTO: 0 10E9/L (ref 0–0.2)
BASOPHILS NFR BLD AUTO: 0.5 %
DIFFERENTIAL METHOD BLD: NORMAL
EOSINOPHIL # BLD AUTO: 0.1 10E9/L (ref 0–0.7)
EOSINOPHIL NFR BLD AUTO: 1.4 %
ERYTHROCYTE [DISTWIDTH] IN BLOOD BY AUTOMATED COUNT: 13 % (ref 10–15)
HCT VFR BLD AUTO: 44.4 % (ref 35–47)
HGB BLD-MCNC: 14.9 G/DL (ref 11.7–15.7)
IMM GRANULOCYTES # BLD: 0 10E9/L (ref 0–0.4)
IMM GRANULOCYTES NFR BLD: 0.2 %
LYMPHOCYTES # BLD AUTO: 0.9 10E9/L (ref 0.8–5.3)
LYMPHOCYTES NFR BLD AUTO: 21.7 %
MCH RBC QN AUTO: 31.4 PG (ref 26.5–33)
MCHC RBC AUTO-ENTMCNC: 33.6 G/DL (ref 31.5–36.5)
MCV RBC AUTO: 94 FL (ref 78–100)
MONOCYTES # BLD AUTO: 0.5 10E9/L (ref 0–1.3)
MONOCYTES NFR BLD AUTO: 12.4 %
NEUTROPHILS # BLD AUTO: 2.7 10E9/L (ref 1.6–8.3)
NEUTROPHILS NFR BLD AUTO: 63.8 %
NRBC # BLD AUTO: 0 10*3/UL
NRBC BLD AUTO-RTO: 0 /100
PLATELET # BLD AUTO: 178 10E9/L (ref 150–450)
RBC # BLD AUTO: 4.75 10E12/L (ref 3.8–5.2)
WBC # BLD AUTO: 4.3 10E9/L (ref 4–11)

## 2017-11-07 PROCEDURE — 71020 XR CHEST 2 VW: CPT | Mod: TC

## 2017-11-07 PROCEDURE — 85025 COMPLETE CBC W/AUTO DIFF WBC: CPT | Performed by: FAMILY MEDICINE

## 2017-11-07 PROCEDURE — 99284 EMERGENCY DEPT VISIT MOD MDM: CPT | Performed by: FAMILY MEDICINE

## 2017-11-07 PROCEDURE — 36415 COLL VENOUS BLD VENIPUNCTURE: CPT | Performed by: FAMILY MEDICINE

## 2017-11-07 PROCEDURE — 99284 EMERGENCY DEPT VISIT MOD MDM: CPT | Mod: 25

## 2017-11-07 ASSESSMENT — ENCOUNTER SYMPTOMS
ABDOMINAL PAIN: 0
NEUROLOGICAL NEGATIVE: 1
DYSURIA: 0
ACTIVITY CHANGE: 0
FEVER: 0
PSYCHIATRIC NEGATIVE: 1
CHOKING: 1

## 2017-11-07 NOTE — ED NOTES
"Pt c/o hoarse throat \"I think it is from the lipitor\". Pt states hoarseness worse after stroke in June/2017 \"I am recovering really well from the stroke. Pt states on plavix.  "

## 2017-11-07 NOTE — TELEPHONE ENCOUNTER
Patient instructed to be evaluated at urgent care for her concerns and discussed she should also try humidifier at home. Patient is worried and will go to urgent care to be seen as Dr Lora is out of the office

## 2017-11-07 NOTE — TELEPHONE ENCOUNTER
3:10 PM    Reason for Call: Phone Call    Description:  Patient called and said she takes both lipitor and plavix and she had a bloody drainage in her throat today, she would like Dr. Theodora Lora's nurse to call her back at [phone]    Was an appointment offered for this call? Yes    Preferred method for responding to this message: Telephone Call     What is your phone number ? [phone]    If we cannot reach you directly, may we leave a detailed response at the number you provided? Yes    Can this message wait until your PCP/provider returns, if available today? PCP is out until tomorrow, Patient is aware, if a covering nurse could call her back, please advise    Georgie Lewis

## 2017-11-07 NOTE — ED NOTES
"Patient presents with her  of 50 plus years with concerns of \"coughing/spitting up some blood this AM when she was brushing her teeth\" patient states this is the only time today that this happened to her.  Patient states every AM she has to clear her throat/cough up.  Patient is on Plavix 2nd to a stroke in June.  Patient is awake/alert and interactive; talking and joking with staff; no acute distress noted.  Lungs are clear and equal through out, skin is warm, dry, pink and intact.  Rest of nursing assessment as charted.  Call light within reach and  at bedside.  "

## 2017-11-07 NOTE — ED AVS SNAPSHOT
[facility]    [address] [address]    Phone:  [phone]                                       Emily Olson   MRN: [id number]    Department:  [facility]   Date of Visit:  11/7/2017           After Visit Summary Signature Page     I have received my discharge instructions, and my questions have been answered. I have discussed any challenges I see with this plan with the nurse or doctor.    ..........................................................................................................................................  Patient/Patient Representative Signature      ..........................................................................................................................................  Patient Representative Print Name and Relationship to Patient    ..................................................               ................................................  Date                                            Time    ..........................................................................................................................................  Reviewed by Signature/Title    ...................................................              ..............................................  Date                                                            Time

## 2017-11-07 NOTE — ED AVS SNAPSHOT
[facility]    [address]    [place] [address]    Phone:  [phone]                                       Emily Olson   MRN: [id number]    Department:  [facility]   Date of Visit:  11/7/2017           Patient Information     Date Of Birth          1945        Your diagnoses for this visit were:     Hemoptysis        You were seen by Judy Roblero MD.      Follow-up Information     Follow up with Theodora Lora MD In 1 week.    Specialty:  Family Practice    Why:  If symptoms worsen, or fail to improve    Contact information:    [facility]  [address] [place] [address]  [phone]          Discharge Instructions         Get a humidifier for your bedroom at night.  Clean it with vinegar and rinse well at least twice a week to avoid bacterial growth.       Review of your medicines      Our records show that you are taking the medicines listed below. If these are incorrect, please call your family doctor or clinic.        Dose / Directions Last dose taken    atorvastatin 10 MG tablet   Commonly known as:  LIPITOR   Dose:  10 mg   Quantity:  30 tablet        Take 1 tablet (10 mg) by mouth daily   Refills:  1        calcium-vitamin D 600-400 MG-UNIT per tablet   Commonly known as:  CALTRATE   Dose:  1 tablet        Take 1 tablet by mouth daily   Refills:  0        clopidogrel 75 MG tablet   Commonly known as:  PLAVIX   Dose:  75 mg   Quantity:  30 tablet        Take 1 tablet (75 mg) by mouth daily   Refills:  3        DAILY MULTIVITAMIN PO        Take 1 tablet by oral route every day with food   Refills:  0                Procedures and tests performed during your visit     CBC with platelets differential    Chest XR,  PA & LAT      Orders Needing Specimen Collection     None      Pending Results     Date and Time Order Name Status Description    11/7/2017 0600 Chest XR,  PA & LAT In process             Pending Culture Results      "No orders found from 2017 to 2017.            Thank you for choosing [facility]       Thank you for choosing [facility] for your care. Our goal is always to provide you with excellent care. Hearing back from our patients is one way we can continue to improve our services. Please take a few minutes to complete the written survey that you may receive in the mail after you visit with us. Thank you!        [employer] Information     [employer] lets you send messages to your doctor, view your test results, renew your prescriptions, schedule appointments and more. To sign up, go to [email or web address] . Click on \"Log in\" on the left side of the screen, which will take you to the Welcome page. Then click on \"Sign up Now\" on the right side of the page.     You will be asked to enter the access code listed below, as well as some personal information. Please follow the directions to create your username and password.     Your access code is: [email or web address]  Expires: 2018  6:40 PM     Your access code will  in 90 days. If you need help or a new code, please call your [facility] clinic or [phone].        Care EveryWhere ID     This is your Care EveryWhere ID. This could be used by other organizations to access your [facility] medical records  [id number]        Equal Access to Services     ALLI PHILIP : Hadii chelsie Ridley, waaxda luqadaha, qaybta kaalmada shreya, maurilio ackerman . So [facility] [phone].    ATENCIÓN: Si habla español, tiene a torres disposición servicios gratuitos de asistencia lingüística. Llame al [phone].    We comply with applicable federal civil rights laws and [place] laws. We do not discriminate on the basis of race, color, national origin, age, disability, sex, sexual orientation, or gender identity.            After Visit Summary       This is your record. Keep this with you and show to your community pharmacist(s) and doctor(s) at your next visit. "

## 2017-11-07 NOTE — ED PROVIDER NOTES
"  History     Chief Complaint   Patient presents with     Hemoptysis     Stated after brushing teath today she coughed up \"a big blob of blood\". \"I think it was from my sinuses- I feel congested every morning when I wake up\".     HPI  Emily Olson is a 72 year old female who has been having to clear thick mucous out of her throat every morning.  She had some blood in the mucous this morning and was concerned because she is on Plavix .  She says her sinuses are congested when she gets up in the morning, and she does mouth breathe.  She will be having sleep apnea testing at [facility] later this month.  We discussed humidifier in the bedroom to avoid drying of the throat.      Problem List:    Patient Active Problem List    Diagnosis Date Noted     ACP (advance care planning) 06/23/2017     Priority: Medium     Advance Care Planning 6/23/2017: ACP Review of Chart / Resources Provided:  Reviewed chart for advance care plan.  Emily Olson has an advance care plan which needs to be updated. Patient states presence of new/updated ACP document. Copy requested  Added by Tessie Quevedo             Left carotid artery occlusion 06/23/2017     Priority: Medium     Left sided cerebral hemisphere cerebrovascular accident (H) 06/23/2017     Priority: Medium     Bilateral lower extremity edema      Priority: Medium     Trochanteric bursitis, right hip      Priority: Medium     Urge incontinence      Priority: Medium     Pulmonary nodule      Priority: Medium     GERD (gastroesophageal reflux disease)      Priority: Medium     Osteoporosis      Priority: Medium     Hyperlipidemia, unspecified      Priority: Medium        Past Medical History:    Past Medical History:   Diagnosis Date     Bilateral lower extremity edema      Encephalopathy      GERD (gastroesophageal reflux disease)      Hyperlipidemia, unspecified      Osteoporosis      Pulmonary embolism (H) 2014     Pulmonary nodule      Sebaceous cyst of labia      " "Trochanteric bursitis, right hip      Urge incontinence        Past Surgical History:    Past Surgical History:   Procedure Laterality Date     breast biopsy/benign      LT     total abdominal hysterectomy  1976    cervix removed as well       Family History:    Family History   Problem Relation Age of Onset     CANCER Father 62     Lung, cause of death     Breast Cancer Mother 90     Breast Cancer Sister      Breast Cancer Sister      Prostate Cancer Brother        Social History:  Marital Status:   [2]  Social History   Substance Use Topics     Smoking status: Never Smoker     Smokeless tobacco: Never Used     Alcohol use No        Medications:      clopidogrel (PLAVIX) 75 MG tablet   calcium-vitamin D (CALTRATE) 600-400 MG-UNIT per tablet   atorvastatin (LIPITOR) 10 MG tablet   Multiple Vitamin (DAILY MULTIVITAMIN PO)         Review of Systems   Constitutional: Negative for activity change and fever.   HENT: Positive for congestion. Negative for ear pain.    Respiratory: Positive for choking.         Clears when she gets the mucous up in the morning.   Cardiovascular: Negative for chest pain.   Gastrointestinal: Negative for abdominal pain.   Genitourinary: Negative for dysuria.   Neurological: Negative.    Psychiatric/Behavioral: Negative.        Physical Exam   BP: 173/95  Heart Rate: 85  Temp: 98  F (36.7  C)  Resp: 16  Height: 170.2 cm (5' 7\")  SpO2: 97 %      Physical Exam   Constitutional: She is oriented to person, place, and time. She appears well-developed and well-nourished. No distress.   HENT:   Head: Normocephalic and atraumatic.   Neck: Normal range of motion. Neck supple.   Cardiovascular: Normal rate, regular rhythm and normal heart sounds.    No murmur heard.  Pulmonary/Chest: Effort normal and breath sounds normal. No respiratory distress.   Abdominal: Soft. Bowel sounds are normal. She exhibits no distension. There is no tenderness.   Musculoskeletal: Normal range of motion. "   Neurological: She is alert and oriented to person, place, and time.   Skin: Skin is warm and dry.   Psychiatric: She has a normal mood and affect.   Nursing note and vitals reviewed.      ED Course     ED Course     Procedures    Labs Ordered and Resulted from Time of ED Arrival Up to the Time of Departure from the ED   CBC WITH PLATELETS DIFFERENTIAL       Assessments & Plan (with Medical Decision Making)   Patient has normal CXR and CBC.  Encouraged her to get humidifier and follow through with sleep study.  She intends to do both.  Follow up with primary care as needed.    I have reviewed the nursing notes.    I have reviewed the findings, diagnosis, plan and need for follow up with the patient.    New Prescriptions    No medications on file       Final diagnoses:   Hemoptysis       11/7/2017   [facility]     Judy Roblero MD  11/07/17 9711

## 2017-11-08 NOTE — PROGRESS NOTES
Chest XR - IMPRESSION: Mild right basilar atelectasis otherwise clear chest.  Encouraged to get humidifier and follow through with sleep study.  Advised to follow up with primary care as needed.  Report routed to PCP, Dr. ABDOUL Lora.

## 2017-11-08 NOTE — ED NOTES
DC instructions reviewed with patient and  both verbalize understanding and have no questions.  Home to rest.

## 2017-11-08 NOTE — DISCHARGE INSTRUCTIONS
Get a humidifier for your bedroom at night.  Clean it with vinegar and rinse well at least twice a week to avoid bacterial growth.

## 2017-11-28 ENCOUNTER — TRANSFERRED RECORDS (OUTPATIENT)
Dept: HEALTH INFORMATION MANAGEMENT | Facility: HOSPITAL | Age: 72
End: 2017-11-28

## 2018-01-04 ENCOUNTER — OFFICE VISIT (OUTPATIENT)
Dept: FAMILY MEDICINE | Facility: OTHER | Age: 73
End: 2018-01-04
Attending: FAMILY MEDICINE
Payer: MEDICARE

## 2018-01-04 ENCOUNTER — TELEPHONE (OUTPATIENT)
Dept: FAMILY MEDICINE | Facility: OTHER | Age: 73
End: 2018-01-04

## 2018-01-04 VITALS
OXYGEN SATURATION: 98 % | HEART RATE: 77 BPM | BODY MASS INDEX: 25.77 KG/M2 | TEMPERATURE: 97 F | SYSTOLIC BLOOD PRESSURE: 138 MMHG | RESPIRATION RATE: 16 BRPM | DIASTOLIC BLOOD PRESSURE: 74 MMHG | WEIGHT: 164.2 LBS | HEIGHT: 67 IN

## 2018-01-04 DIAGNOSIS — Z86.73 HISTORY OF STROKE: ICD-10-CM

## 2018-01-04 DIAGNOSIS — R35.0 URINARY FREQUENCY: Primary | ICD-10-CM

## 2018-01-04 DIAGNOSIS — R17 ELEVATED BILIRUBIN: ICD-10-CM

## 2018-01-04 DIAGNOSIS — R74.8 ELEVATED CK: ICD-10-CM

## 2018-01-04 DIAGNOSIS — D58.2 ELEVATED HEMOGLOBIN (H): ICD-10-CM

## 2018-01-04 DIAGNOSIS — R68.2 DRY MOUTH: ICD-10-CM

## 2018-01-04 DIAGNOSIS — M79.10 MYALGIA: ICD-10-CM

## 2018-01-04 LAB
ALBUMIN SERPL-MCNC: 3.7 G/DL (ref 3.4–5)
ALBUMIN UR-MCNC: 10 MG/DL
ALP SERPL-CCNC: 81 U/L (ref 40–150)
ALT SERPL W P-5'-P-CCNC: 33 U/L (ref 0–50)
ANION GAP SERPL CALCULATED.3IONS-SCNC: 7 MMOL/L (ref 3–14)
APPEARANCE UR: CLEAR
AST SERPL W P-5'-P-CCNC: 27 U/L (ref 0–45)
BACTERIA #/AREA URNS HPF: ABNORMAL /HPF
BASOPHILS # BLD AUTO: 0 10E9/L (ref 0–0.2)
BASOPHILS NFR BLD AUTO: 0.5 %
BILIRUB SERPL-MCNC: 1.6 MG/DL (ref 0.2–1.3)
BILIRUB UR QL STRIP: NEGATIVE
BUN SERPL-MCNC: 18 MG/DL (ref 7–30)
CALCIUM SERPL-MCNC: 8.9 MG/DL (ref 8.5–10.1)
CHLORIDE SERPL-SCNC: 107 MMOL/L (ref 94–109)
CK SERPL-CCNC: 435 U/L (ref 30–225)
CO2 SERPL-SCNC: 27 MMOL/L (ref 20–32)
COLOR UR AUTO: YELLOW
CREAT SERPL-MCNC: 0.84 MG/DL (ref 0.52–1.04)
DIFFERENTIAL METHOD BLD: ABNORMAL
EOSINOPHIL # BLD AUTO: 0.1 10E9/L (ref 0–0.7)
EOSINOPHIL NFR BLD AUTO: 1.7 %
ERYTHROCYTE [DISTWIDTH] IN BLOOD BY AUTOMATED COUNT: 13.1 % (ref 10–15)
GFR SERPL CREATININE-BSD FRML MDRD: 66 ML/MIN/1.7M2
GLUCOSE SERPL-MCNC: 85 MG/DL (ref 70–99)
GLUCOSE UR STRIP-MCNC: NEGATIVE MG/DL
HCT VFR BLD AUTO: 48.6 % (ref 35–47)
HGB BLD-MCNC: 16.6 G/DL (ref 11.7–15.7)
HGB UR QL STRIP: NEGATIVE
IMM GRANULOCYTES # BLD: 0 10E9/L (ref 0–0.4)
IMM GRANULOCYTES NFR BLD: 0.5 %
KETONES UR STRIP-MCNC: NEGATIVE MG/DL
LEUKOCYTE ESTERASE UR QL STRIP: ABNORMAL
LYMPHOCYTES # BLD AUTO: 1.2 10E9/L (ref 0.8–5.3)
LYMPHOCYTES NFR BLD AUTO: 19.3 %
MCH RBC QN AUTO: 32 PG (ref 26.5–33)
MCHC RBC AUTO-ENTMCNC: 34.2 G/DL (ref 31.5–36.5)
MCV RBC AUTO: 94 FL (ref 78–100)
MONOCYTES # BLD AUTO: 0.7 10E9/L (ref 0–1.3)
MONOCYTES NFR BLD AUTO: 11.1 %
MUCOUS THREADS #/AREA URNS LPF: PRESENT /LPF
NEUTROPHILS # BLD AUTO: 4.3 10E9/L (ref 1.6–8.3)
NEUTROPHILS NFR BLD AUTO: 66.9 %
NITRATE UR QL: NEGATIVE
NRBC # BLD AUTO: 0 10*3/UL
NRBC BLD AUTO-RTO: 0 /100
PH UR STRIP: 5.5 PH (ref 4.7–8)
PLATELET # BLD AUTO: 175 10E9/L (ref 150–450)
POTASSIUM SERPL-SCNC: 4.2 MMOL/L (ref 3.4–5.3)
PROT SERPL-MCNC: 7.2 G/DL (ref 6.8–8.8)
RBC # BLD AUTO: 5.18 10E12/L (ref 3.8–5.2)
RBC #/AREA URNS AUTO: 1 /HPF (ref 0–2)
SODIUM SERPL-SCNC: 141 MMOL/L (ref 133–144)
SOURCE: ABNORMAL
SP GR UR STRIP: 1.02 (ref 1–1.03)
SQUAMOUS #/AREA URNS AUTO: 1 /HPF (ref 0–1)
UROBILINOGEN UR STRIP-MCNC: NORMAL MG/DL (ref 0–2)
WBC # BLD AUTO: 6.4 10E9/L (ref 4–11)
WBC #/AREA URNS AUTO: 1 /HPF (ref 0–2)

## 2018-01-04 PROCEDURE — G0463 HOSPITAL OUTPT CLINIC VISIT: HCPCS

## 2018-01-04 PROCEDURE — 80053 COMPREHEN METABOLIC PANEL: CPT | Mod: ZL | Performed by: FAMILY MEDICINE

## 2018-01-04 PROCEDURE — 36415 COLL VENOUS BLD VENIPUNCTURE: CPT | Mod: ZL | Performed by: FAMILY MEDICINE

## 2018-01-04 PROCEDURE — 81001 URINALYSIS AUTO W/SCOPE: CPT | Mod: ZL | Performed by: FAMILY MEDICINE

## 2018-01-04 PROCEDURE — 82550 ASSAY OF CK (CPK): CPT | Mod: ZL | Performed by: FAMILY MEDICINE

## 2018-01-04 PROCEDURE — 85025 COMPLETE CBC W/AUTO DIFF WBC: CPT | Mod: ZL | Performed by: FAMILY MEDICINE

## 2018-01-04 PROCEDURE — 99213 OFFICE O/P EST LOW 20 MIN: CPT | Performed by: FAMILY MEDICINE

## 2018-01-04 RX ORDER — ASPIRIN 81 MG/1
81 TABLET, CHEWABLE ORAL
COMMUNITY
End: 2018-01-04

## 2018-01-04 ASSESSMENT — PAIN SCALES - GENERAL: PAINLEVEL: NO PAIN (0)

## 2018-01-04 ASSESSMENT — PATIENT HEALTH QUESTIONNAIRE - PHQ9: SUM OF ALL RESPONSES TO PHQ QUESTIONS 1-9: 0

## 2018-01-04 ASSESSMENT — ANXIETY QUESTIONNAIRES
2. NOT BEING ABLE TO STOP OR CONTROL WORRYING: NOT AT ALL
IF YOU CHECKED OFF ANY PROBLEMS ON THIS QUESTIONNAIRE, HOW DIFFICULT HAVE THESE PROBLEMS MADE IT FOR YOU TO DO YOUR WORK, TAKE CARE OF THINGS AT HOME, OR GET ALONG WITH OTHER PEOPLE: NOT DIFFICULT AT ALL
6. BECOMING EASILY ANNOYED OR IRRITABLE: NOT AT ALL
1. FEELING NERVOUS, ANXIOUS, OR ON EDGE: NOT AT ALL
5. BEING SO RESTLESS THAT IT IS HARD TO SIT STILL: NOT AT ALL
4. TROUBLE RELAXING: NOT AT ALL
3. WORRYING TOO MUCH ABOUT DIFFERENT THINGS: NOT AT ALL
7. FEELING AFRAID AS IF SOMETHING AWFUL MIGHT HAPPEN: NOT AT ALL
GAD7 TOTAL SCORE: 0

## 2018-01-04 NOTE — MR AVS SNAPSHOT
"              After Visit Summary   1/4/2018    Emily Olson    MRN: [id number]           Patient Information     Date Of Birth          1945        Visit Information        Provider Department      1/4/2018 11:00 AM Theodora Lora MD [facility]        Today's Diagnoses     Urinary frequency    -  1    Dry mouth        History of stroke        Myalgia        Elevated CK        Elevated hemoglobin (H)        Elevated bilirubin          Care Instructions    Will call with lab results.          Follow-ups after your visit        Future tests that were ordered for you today     Open Future Orders        Priority Expected Expires Ordered    CRP inflammation ASAP  7/4/2018 1/4/2018    CK total Routine  7/4/2018 1/4/2018    Bilirubin Direct and Total Routine  7/4/2018 1/4/2018    CBC with platelets and differential Routine  7/4/2018 1/4/2018            Who to contact     If you have questions or need follow up information about today's clinic visit or your schedule please contact [facility] directly at [phone].  Normal or non-critical lab and imaging results will be communicated to you by [employer], letter or phone within 4 business days after the clinic has received the results. If you do not hear from us within 7 days, please contact the clinic through [employer] or phone. If you have a critical or abnormal lab result, we will notify you by phone as soon as possible.  Submit refill requests through [employer] or call your pharmacy and they will forward the refill request to us. Please allow 3 business days for your refill to be completed.          Additional Information About Your Visit        [employer] Information     [employer] lets you send messages to your doctor, view your test results, renew your prescriptions, schedule appointments and more. To sign up, go to [email or web address] . Click on \"Log in\" on the left side of the screen, which will take you to the Welcome page. Then " "click on \"Sign up Now\" on the right side of the page.     You will be asked to enter the access code listed below, as well as some personal information. Please follow the directions to create your username and password.     Your access code is: [email or web address]  Expires: 2018  6:40 PM     Your access code will  in 90 days. If you need help or a new code, please call your [facility] clinic or [phone].        Care EveryWhere ID     This is your Care EveryWhere ID. This could be used by other organizations to access your [facility] medical records  [id number]        Your Vitals Were     Pulse Temperature Respirations Height Pulse Oximetry BMI (Body Mass Index)    77 97  F (36.1  C) (Tympanic) 16 5' 7\" (1.702 m) 98% 25.72 kg/m2       Blood Pressure from Last 3 Encounters:   18 138/74   17 173/95   17 166/88    Weight from Last 3 Encounters:   18 164 lb 3.2 oz (74.5 kg)   17 165 lb (74.8 kg)   17 165 lb (74.8 kg)              We Performed the Following     CBC with platelets and differential     CK total     Comprehensive metabolic panel     UA reflex to Microscopic and Culture        Primary Care Provider Office Phone # Fax #    Theodora Lora [phone]       [facility] [address] [address]        Equal Access to Services     WILLAM [facility] HospitalGALA : Hadii aad ku hadasho Soomaali, waaxda luqadaha, qaybta kaalmada adeegyada, maurilio ackerman . So [facility] [phone].    ATENCIÓN: Si habla español, tiene a torres disposición servicios gratuitos de asistencia lingüística. Llame al [phone].    We comply with applicable federal civil rights laws and [place] laws. We do not discriminate on the basis of race, color, national origin, age, disability, sex, sexual orientation, or gender identity.            Thank you!     Thank you for choosing [facility]  for your care. Our goal is always to provide you with " excellent care. Hearing back from our patients is one way we can continue to improve our services. Please take a few minutes to complete the written survey that you may receive in the mail after your visit with us. Thank you!             Your Updated Medication List - Protect others around you: Learn how to safely use, store and throw away your medicines at www.disposemymeds.org.          This list is accurate as of: 1/4/18 12:31 PM.  Always use your most recent med list.                   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

## 2018-01-04 NOTE — NURSING NOTE
"Chief Complaint   Patient presents with     Medication Problem     Dry mouth, hoarseness, swelling on the top lip, patient states she gets up to urinate twice a night which isn't the norm, stiffness in both hips.  Patient believes it to be Lipitor.       Initial /74 (BP Location: Right arm, Patient Position: Sitting, Cuff Size: Adult Regular)  Pulse 77  Temp 97  F (36.1  C) (Tympanic)  Resp 16  Ht 5' 7\" (1.702 m)  Wt 164 lb 3.2 oz (74.5 kg)  SpO2 98%  BMI 25.72 kg/m2 Estimated body mass index is 25.72 kg/(m^2) as calculated from the following:    Height as of this encounter: 5' 7\" (1.702 m).    Weight as of this encounter: 164 lb 3.2 oz (74.5 kg).  Medication Reconciliation: complete     Valery Tee      "

## 2018-01-04 NOTE — PROGRESS NOTES
SUBJECTIVE:  Emily is a 72 year old female who comes in today for concern about possible intolerance or allergy to Lipitor.  Has noted swelling of top lip, nocturia x2, stiff hips.  Did have injection with orthopedics for bursitis and that helped. Lip swelling is mild, upper lip only, brief/intermittent.  No oral/tongue swelling.  No dyspnea.  Does note urinary frequency day and night.    Did have neurology consult follow up.  Advised continuation of statin and Plavix.  Discontinued Aspirin.    Current Outpatient Prescriptions   Medication     clopidogrel (PLAVIX) 75 MG tablet     calcium-vitamin D (CALTRATE) 600-400 MG-UNIT per tablet     atorvastatin (LIPITOR) 10 MG tablet     Multiple Vitamin (DAILY MULTIVITAMIN PO)     No current facility-administered medications for this visit.         Allergies   Allergen Reactions     Animal Dander      Cat and dog     Cats      Dust Mites Other (See Comments)     House Dust       Grass Other (See Comments)     Grass Poll-Perennial [place], STD       Sulfa Drugs        Past Medical History:   Diagnosis Date     Bilateral lower extremity edema      Encephalopathy     6/2017; ER, Dr Still, [place], [place]; slowed speech and responses; ?ischemic vs dementia; ordered echo, MRI, carotid u/s; neuro referral; MMSE 26/30     GERD (gastroesophageal reflux disease)      Hyperlipidemia, unspecified      Osteoporosis      Pulmonary embolism (H) 2014    [facility]     Pulmonary nodule     CT 5/2017; repeat 9 months     Sebaceous cyst of labia      Trochanteric bursitis, right hip      Urge incontinence      Past Surgical History:   Procedure Laterality Date     breast biopsy/benign      LT     total abdominal hysterectomy  1976    cervix removed as well     Social History     Social History     Marital status:      Spouse name: N/A     Number of children: N/A     Years of education: N/A     Occupational History     Not on file.     Social History Main Topics      "Smoking status: Never Smoker     Smokeless tobacco: Never Used     Alcohol use No     Drug use: No     Sexual activity: Not on file     Other Topics Concern     Caffeine Concern No     Social History Narrative       ROS:  General: negative for, fever, chills  Skin: negative for, rash, bruising  ENT: positive for hoarse voice  Resp: No shortness of breath and No cough  CV: negative for, palpitations and chest pain  GI: negative for, nausea, vomiting and abdominal pain  : positive for as above, nocturia, frequency and urgency, negative for, dysuria and hematuria  Musculoskeletal: positive for joint pain  Neurologic: negative for, local weakness, numbness or tingling of hands and numbness or tingling of feet  Psychiatric: negative  PHQ-9 SCORE 1/4/2018   Total Score 0     JUAN-7 SCORE 6/23/2017 1/4/2018   Total Score 0 0       OBJECTIVE:  Vitals:    01/04/18 1044   BP: 138/74   BP Location: Right arm   Patient Position: Sitting   Cuff Size: Adult Regular   Pulse: 77   Resp: 16   Temp: 97  F (36.1  C)   TempSrc: Tympanic   SpO2: 98%   Weight: 164 lb 3.2 oz (74.5 kg)   Height: 5' 7\" (1.702 m)     GENERAL APPEARANCE: healthy, alert and no distress  EYES: EOMI, fundi benign- PERRL  HENT: ear canals and TM's normal and nose and mouth without ulcers or lesions  NECK: no adenopathy, no asymmetry, masses, or scars and thyroid normal to palpation  RESP: lungs clear to auscultation - no rales, rhonchi or wheezes  CV: regular rates and rhythm, normal S1 S2, no S3 or S4 and no murmur, click or rub -  ABDOMEN:  soft, nontender, no HSM or masses and bowel sounds normal  GU_female: no CVA tenderness  SKIN: no suspicious lesions or rashes  PSYCH: mentation appears normal. and affect normal/bright    Results for orders placed or performed in visit on 01/04/18 (from the past 24 hour(s))   CBC with platelets and differential   Result Value Ref Range    WBC 6.4 4.0 - 11.0 10e9/L    RBC Count 5.18 3.8 - 5.2 10e12/L    Hemoglobin 16.6 (H) " 11.7 - 15.7 g/dL    Hematocrit 48.6 (H) 35.0 - 47.0 %    MCV 94 78 - 100 fl    MCH 32.0 26.5 - 33.0 pg    MCHC 34.2 31.5 - 36.5 g/dL    RDW 13.1 10.0 - 15.0 %    Platelet Count 175 150 - 450 10e9/L    Diff Method Automated Method     % Neutrophils 66.9 %    % Lymphocytes 19.3 %    % Monocytes 11.1 %    % Eosinophils 1.7 %    % Basophils 0.5 %    % Immature Granulocytes 0.5 %    Nucleated RBCs 0 0 /100    Absolute Neutrophil 4.3 1.6 - 8.3 10e9/L    Absolute Lymphocytes 1.2 0.8 - 5.3 10e9/L    Absolute Monocytes 0.7 0.0 - 1.3 10e9/L    Absolute Eosinophils 0.1 0.0 - 0.7 10e9/L    Absolute Basophils 0.0 0.0 - 0.2 10e9/L    Abs Immature Granulocytes 0.0 0 - 0.4 10e9/L    Absolute Nucleated RBC 0.0    Comprehensive metabolic panel   Result Value Ref Range    Sodium 141 133 - 144 mmol/L    Potassium 4.2 3.4 - 5.3 mmol/L    Chloride 107 94 - 109 mmol/L    Carbon Dioxide 27 20 - 32 mmol/L    Anion Gap 7 3 - 14 mmol/L    Glucose 85 70 - 99 mg/dL    Urea Nitrogen 18 7 - 30 mg/dL    Creatinine 0.84 0.52 - 1.04 mg/dL    GFR Estimate 66 >60 mL/min/1.7m2    GFR Estimate If Black 80 >60 mL/min/1.7m2    Calcium 8.9 8.5 - 10.1 mg/dL    Bilirubin Total 1.6 (H) 0.2 - 1.3 mg/dL    Albumin 3.7 3.4 - 5.0 g/dL    Protein Total 7.2 6.8 - 8.8 g/dL    Alkaline Phosphatase 81 40 - 150 U/L    ALT 33 0 - 50 U/L    AST 27 0 - 45 U/L   CK total   Result Value Ref Range    CK Total 435 (H) 30 - 225 U/L   UA reflex to Microscopic and Culture   Result Value Ref Range    Color Urine Yellow     Appearance Urine Clear     Glucose Urine Negative NEG^Negative mg/dL    Bilirubin Urine Negative NEG^Negative    Ketones Urine Negative NEG^Negative mg/dL    Specific Gravity Urine 1.020 1.003 - 1.035    Blood Urine Negative NEG^Negative    pH Urine 5.5 4.7 - 8.0 pH    Protein Albumin Urine 10 (A) NEG^Negative mg/dL    Urobilinogen mg/dL Normal 0.0 - 2.0 mg/dL    Nitrite Urine Negative NEG^Negative    Leukocyte Esterase Urine Small (A) NEG^Negative    Source  Midstream Urine     RBC Urine 1 0 - 2 /HPF    WBC Urine 1 0 - 2 /HPF    Bacteria Urine None (A) NEG^Negative /HPF    Squamous Epithelial /HPF Urine 1 0 - 1 /HPF    Mucous Urine Present (A) NEG^Negative /LPF     ASSESSMENT/ORDERS:    ICD-10-CM    1. Urinary frequency R35.0 CBC with platelets and differential     Comprehensive metabolic panel     UA reflex to Microscopic and Culture   2. Dry mouth R68.2 CBC with platelets and differential     Comprehensive metabolic panel   3. History of stroke Z86.73 CBC with platelets and differential     Comprehensive metabolic panel   4. Myalgia M79.1 CK total   5. Elevated CK R74.8 CK total     CRP inflammation   6. Elevated hemoglobin (H) D58.2 CBC with platelets and differential   7. Elevated bilirubin R17 Bilirubin Direct and Total     PLAN:  Not sure all her symptoms are related to her statin. Will evaluate with labs.  Discussed trial off statin to see if symptoms change.    Also, signed release to get records from neurology recent visit.    Patient Instructions   Will call with lab results.    Labs reviewed.  CK elevated.  HGB and bilirubin also mildly elevated.  Will hold statin and repeat labs in 1 month.      Theodora Thornton

## 2018-01-05 ASSESSMENT — ANXIETY QUESTIONNAIRES: GAD7 TOTAL SCORE: 0

## 2018-01-17 ENCOUNTER — TRANSFERRED RECORDS (OUTPATIENT)
Dept: HEALTH INFORMATION MANAGEMENT | Facility: HOSPITAL | Age: 73
End: 2018-01-17

## 2018-02-05 DIAGNOSIS — D58.2 ELEVATED HEMOGLOBIN (H): ICD-10-CM

## 2018-02-05 DIAGNOSIS — R17 ELEVATED BILIRUBIN: ICD-10-CM

## 2018-02-05 DIAGNOSIS — R74.8 ELEVATED CK: ICD-10-CM

## 2018-02-05 LAB
BASOPHILS # BLD AUTO: 0 10E9/L (ref 0–0.2)
BASOPHILS NFR BLD AUTO: 0.8 %
BILIRUB DIRECT SERPL-MCNC: 0.2 MG/DL (ref 0–0.2)
BILIRUB SERPL-MCNC: 1 MG/DL (ref 0.2–1.3)
CK SERPL-CCNC: 108 U/L (ref 30–225)
CRP SERPL-MCNC: <2.9 MG/L (ref 0–8)
DIFFERENTIAL METHOD BLD: ABNORMAL
EOSINOPHIL # BLD AUTO: 0.1 10E9/L (ref 0–0.7)
EOSINOPHIL NFR BLD AUTO: 3.3 %
ERYTHROCYTE [DISTWIDTH] IN BLOOD BY AUTOMATED COUNT: 12.5 % (ref 10–15)
HCT VFR BLD AUTO: 48.2 % (ref 35–47)
HGB BLD-MCNC: 16.3 G/DL (ref 11.7–15.7)
IMM GRANULOCYTES # BLD: 0 10E9/L (ref 0–0.4)
IMM GRANULOCYTES NFR BLD: 0.5 %
LYMPHOCYTES # BLD AUTO: 1 10E9/L (ref 0.8–5.3)
LYMPHOCYTES NFR BLD AUTO: 25.6 %
MCH RBC QN AUTO: 31.8 PG (ref 26.5–33)
MCHC RBC AUTO-ENTMCNC: 33.8 G/DL (ref 31.5–36.5)
MCV RBC AUTO: 94 FL (ref 78–100)
MONOCYTES # BLD AUTO: 0.5 10E9/L (ref 0–1.3)
MONOCYTES NFR BLD AUTO: 12.7 %
NEUTROPHILS # BLD AUTO: 2.3 10E9/L (ref 1.6–8.3)
NEUTROPHILS NFR BLD AUTO: 57.1 %
NRBC # BLD AUTO: 0 10*3/UL
NRBC BLD AUTO-RTO: 0 /100
PLATELET # BLD AUTO: 194 10E9/L (ref 150–450)
RBC # BLD AUTO: 5.13 10E12/L (ref 3.8–5.2)
WBC # BLD AUTO: 3.9 10E9/L (ref 4–11)

## 2018-02-05 PROCEDURE — 86140 C-REACTIVE PROTEIN: CPT | Mod: ZL | Performed by: FAMILY MEDICINE

## 2018-02-05 PROCEDURE — 36415 COLL VENOUS BLD VENIPUNCTURE: CPT | Mod: ZL | Performed by: FAMILY MEDICINE

## 2018-02-05 PROCEDURE — 82247 BILIRUBIN TOTAL: CPT | Mod: ZL | Performed by: FAMILY MEDICINE

## 2018-02-05 PROCEDURE — 82550 ASSAY OF CK (CPK): CPT | Mod: ZL | Performed by: FAMILY MEDICINE

## 2018-02-05 PROCEDURE — 82248 BILIRUBIN DIRECT: CPT | Mod: ZL | Performed by: FAMILY MEDICINE

## 2018-02-05 PROCEDURE — 85025 COMPLETE CBC W/AUTO DIFF WBC: CPT | Mod: ZL | Performed by: FAMILY MEDICINE

## 2018-02-09 ENCOUNTER — TELEPHONE (OUTPATIENT)
Dept: FAMILY MEDICINE | Facility: OTHER | Age: 73
End: 2018-02-09

## 2018-02-16 ENCOUNTER — OFFICE VISIT (OUTPATIENT)
Dept: FAMILY MEDICINE | Facility: OTHER | Age: 73
End: 2018-02-16
Attending: FAMILY MEDICINE
Payer: MEDICARE

## 2018-02-16 VITALS
DIASTOLIC BLOOD PRESSURE: 74 MMHG | SYSTOLIC BLOOD PRESSURE: 138 MMHG | BODY MASS INDEX: 25.43 KG/M2 | TEMPERATURE: 98.5 F | OXYGEN SATURATION: 97 % | WEIGHT: 162 LBS | HEIGHT: 67 IN | HEART RATE: 72 BPM

## 2018-02-16 DIAGNOSIS — N39.41 URGE INCONTINENCE OF URINE: ICD-10-CM

## 2018-02-16 DIAGNOSIS — E78.5 HYPERLIPIDEMIA, UNSPECIFIED HYPERLIPIDEMIA TYPE: Primary | ICD-10-CM

## 2018-02-16 DIAGNOSIS — I63.9 LEFT SIDED CEREBRAL HEMISPHERE CEREBROVASCULAR ACCIDENT (H): ICD-10-CM

## 2018-02-16 PROCEDURE — G0463 HOSPITAL OUTPT CLINIC VISIT: HCPCS

## 2018-02-16 PROCEDURE — 99213 OFFICE O/P EST LOW 20 MIN: CPT | Performed by: FAMILY MEDICINE

## 2018-02-16 RX ORDER — ROSUVASTATIN CALCIUM 5 MG/1
5 TABLET, COATED ORAL DAILY
Qty: 30 TABLET | Refills: 1 | Status: SHIPPED | OUTPATIENT
Start: 2018-02-16 | End: 2018-03-15

## 2018-02-16 ASSESSMENT — ANXIETY QUESTIONNAIRES
1. FEELING NERVOUS, ANXIOUS, OR ON EDGE: NOT AT ALL
6. BECOMING EASILY ANNOYED OR IRRITABLE: NOT AT ALL
IF YOU CHECKED OFF ANY PROBLEMS ON THIS QUESTIONNAIRE, HOW DIFFICULT HAVE THESE PROBLEMS MADE IT FOR YOU TO DO YOUR WORK, TAKE CARE OF THINGS AT HOME, OR GET ALONG WITH OTHER PEOPLE: NOT DIFFICULT AT ALL
GAD7 TOTAL SCORE: 0
7. FEELING AFRAID AS IF SOMETHING AWFUL MIGHT HAPPEN: NOT AT ALL
5. BEING SO RESTLESS THAT IT IS HARD TO SIT STILL: NOT AT ALL
2. NOT BEING ABLE TO STOP OR CONTROL WORRYING: NOT AT ALL
3. WORRYING TOO MUCH ABOUT DIFFERENT THINGS: NOT AT ALL

## 2018-02-16 ASSESSMENT — PATIENT HEALTH QUESTIONNAIRE - PHQ9: 5. POOR APPETITE OR OVEREATING: NOT AT ALL

## 2018-02-16 ASSESSMENT — PAIN SCALES - GENERAL: PAINLEVEL: NO PAIN (0)

## 2018-02-16 NOTE — PATIENT INSTRUCTIONS
Labs normalized off the Lipitor.    Start different statin at low dose - Crestor 5 mg.  Recheck labs in 1 month.  Come fasting.  Call if having any side effects.

## 2018-02-16 NOTE — NURSING NOTE
"Chief Complaint   Patient presents with     RECHECK     Recheck of lipids-  statin held since last exam 01/04/2018       Initial /74  Pulse 72  Temp 98.5  F (36.9  C) (Tympanic)  Ht 5' 7\" (1.702 m)  Wt 162 lb (73.5 kg)  SpO2 97%  BMI 25.37 kg/m2 Estimated body mass index is 25.37 kg/(m^2) as calculated from the following:    Height as of this encounter: 5' 7\" (1.702 m).    Weight as of this encounter: 162 lb (73.5 kg).  Medication Reconciliation: complete   ILANA CARLTON LPN  "

## 2018-02-16 NOTE — MR AVS SNAPSHOT
After Visit Summary   2/16/2018    Emily Olson    MRN: [id number]           Patient Information     Date Of Birth          1945        Visit Information        Provider Department      2/16/2018 9:15 AM Theodora Lora MD [facility]        Today's Diagnoses     Hyperlipidemia, unspecified hyperlipidemia type    -  1    Left sided cerebral hemisphere cerebrovascular accident (H)        Urge incontinence of urine          Care Instructions    Labs normalized off the Lipitor.    Start different statin at low dose - Crestor 5 mg.  Recheck labs in 1 month.  Come fasting.  Call if having any side effects.           Follow-ups after your visit        Future tests that were ordered for you today     Open Future Orders        Priority Expected Expires Ordered    Lipid Profile (Chol, Trig, HDL, LDL calc) Routine  2/16/2019 2/16/2018    Hepatic panel Routine  2/16/2019 2/16/2018    CK total Routine  2/16/2019 2/16/2018            Who to contact     If you have questions or need follow up information about today's clinic visit or your schedule please contact [facility] directly at [phone].  Normal or non-critical lab and imaging results will be communicated to you by Lo[employer]ronix[employer], letter or phone within 4 business days after the clinic has received the results. If you do not hear from us within 7 days, please contact the clinic through Loctronix[employer] or phone. If you have a critical or abnormal lab result, we will notify you by phone as soon as possible.  Submit refill requests through [email or web address] or call your pharmacy and they will forward the refill request to us. Please allow 3 business days for your refill to be completed.          Additional Information About Your Visit        MyChart Information     [email or web address] lets you send messages to your doctor, view your test results, renew your prescriptions, schedule appointments and more. To sign up, go to [email or web address] . Click  "on \"Log in\" on the left side of the screen, which will take you to the Welcome page. Then click on \"Sign up Now\" on the right side of the page.     You will be asked to enter the access code listed below, as well as some personal information. Please follow the directions to create your username and password.     Your access code is: [email or web address]  Expires: 2018 10:08 AM     Your access code will  in 90 days. If you need help or a new code, please call your [facility] clinic or [phone].        Care EveryWhere ID     This is your Care EveryWhere ID. This could be used by other organizations to access your [facility] medical records  [id number]        Your Vitals Were     Pulse Temperature Height Pulse Oximetry BMI (Body Mass Index)       72 98.5  F (36.9  C) (Tympanic) 5' 7\" (1.702 m) 97% 25.37 kg/m2        Blood Pressure from Last 3 Encounters:   18 138/74   18 138/74   17 173/95    Weight from Last 3 Encounters:   18 162 lb (73.5 kg)   18 164 lb 3.2 oz (74.5 kg)   17 165 lb (74.8 kg)                 Today's Medication Changes          These changes are accurate as of 18 10:08 AM.  If you have any questions, ask your nurse or doctor.               Start taking these medicines.        Dose/Directions    rosuvastatin 5 MG tablet   Commonly known as:  CRESTOR   Used for:  Hyperlipidemia, unspecified hyperlipidemia type, Left sided cerebral hemisphere cerebrovascular accident (H)   Started by:  Theodora Lora MD        Dose:  5 mg   Take 1 tablet (5 mg) by mouth daily   Quantity:  30 tablet   Refills:  1         Stop taking these medicines if you haven't already. Please contact your care team if you have questions.     atorvastatin 10 MG tablet   Commonly known as:  LIPITOR   Stopped by:  Theodora Lora MD                Where to get your medicines      These medications were sent to [facility] [address] [place] [address] [address] AT [facility] OF [address]  [address] [facility] [address]     Phone:  [phone]     rosuvastatin 5 MG tablet                Primary Care Provider Office Phone # Fax #    Theodora Lora [phone]       [address] [place] [address]        Equal Access to Services     [facility]: Hadii aad ku hadasho Soomaali, waaxda luqadaha, qaybta kaalmada adeegyada, waxay idiin hayaan adeeg kharash la'aan . So [facility] [phone].    ATENCIÓN: Si habla español, tiene a torres disposición servicios gratuitos de asistencia lingüística. Llame al [phone].    We comply with applicable federal civil rights laws and [place] laws. We do not discriminate on the basis of race, color, national origin, age, disability, sex, sexual orientation, or gender identity.            Thank you!     Thank you for choosing [facility]  for your care. Our goal is always to provide you with excellent care. Hearing back from our patients is one way we can continue to improve our services. Please take a few minutes to complete the written survey that you may receive in the mail after your visit with us. Thank you!             Your Updated Medication List - Protect others around you: Learn how to safely use, store and throw away your medicines at www.disposemymeds.org.          This list is accurate as of 2/16/18 10:08 AM.  Always use your most recent med list.                   Brand Name Dispense Instructions for use Diagnosis    calcium-vitamin D 600-400 MG-UNIT per tablet    CALTRATE     Take 1 tablet by mouth daily        clopidogrel 75 MG tablet    PLAVIX    30 tablet    Take 1 tablet (75 mg) by mouth daily    Left sided cerebral hemisphere cerebrovascular accident (H)       DAILY MULTIVITAMIN PO      Take 1 tablet by oral route every day with food        rosuvastatin 5 MG tablet    CRESTOR    30 tablet    Take 1 tablet (5 mg) by mouth daily    Hyperlipidemia, unspecified hyperlipidemia type, Left  sided cerebral hemisphere cerebrovascular accident (H)

## 2018-02-16 NOTE — PROGRESS NOTES
SUBJECTIVE:  Emily is a 72 year old female who comes in today for follow up concerns related to statin use.  Did have elevated CK, bilirubin, along with upper lip swelling, which was intermittent, and hip pain, treated with bursa injections.    Decision was made to stop statin and re-evaluate 1 month later.  Upon repeat labs, they have normalized.  All symptoms have also resolved.  Also, reports ongoing urinary incontinence since her CVA, as well as urgency.  Not really frequent, and can sleep through the night.  No dysuria, hematuria.  No stress incontinence.  UA negative 1/2018.      Current Outpatient Prescriptions   Medication     rosuvastatin (CRESTOR) 5 MG tablet     clopidogrel (PLAVIX) 75 MG tablet     calcium-vitamin D (CALTRATE) 600-400 MG-UNIT per tablet     Multiple Vitamin (DAILY MULTIVITAMIN PO)     No current facility-administered medications for this visit.         Allergies   Allergen Reactions     Animal Dander      Cat and dog     Cats      Dust Mites Other (See Comments)     House Dust       Grass Other (See Comments)     Grass Poll-Perennial R[place], STD       Sulfa Drugs        Past Medical History:   Diagnosis Date     Bilateral lower extremity edema      CVA (cerebral vascular accident) (H) 2017    watershed stroke, left hemisphere; occlusion left carotid on CTA; PT/OT/Speech; Plavix/statin; Dr. Aleman     Encephalopathy     6/2017; ER, Dr Still, [place], [place]; slowed speech and responses; ?ischemic vs dementia; ordered echo, MRI, carotid u/s; neuro referral; MMSE 26/30     GERD (gastroesophageal reflux disease)     esophageal dilation 2015     Hyperlipidemia, unspecified      MONET (obstructive sleep apnea)     severe; sleep study 1/17/18; CPAP; Dr. Navarro     Osteoporosis     Forteo injections; prior Fosamax and Boniva with GI side effects; prior Evista, Miacalcin;      Pulmonary embolism (H) 2014    [facility]     Pulmonary nodule     CT 5/2017; repeat 9 months;   "Melanie     Sebaceous cyst of labia      Trochanteric bursitis, right hip      Urge incontinence      Past Surgical History:   Procedure Laterality Date     breast biopsy/benign      LT     COLONOSCOPY  10/22/2010    normal; Dr. Livingston; repeat 10 years     total abdominal hysterectomy  1976    cervix removed as well; [place]/BSO for endometriosis     Social History     Social History     Marital status:      Spouse name: N/A     Number of children: N/A     Years of education: N/A     Occupational History     Not on file.     Social History Main Topics     Smoking status: Never Smoker     Smokeless tobacco: Never Used     Alcohol use No     Drug use: No     Sexual activity: Not on file     Other Topics Concern     Caffeine Concern No     Social History Narrative       ROS:  General: negative for, fever, chills  Skin: negative for, rash, bruising, lumps or bumps  Resp: No shortness of breath and No cough  CV: negative for, palpitations and chest pain  GI: negative for, nausea, vomiting and abdominal pain  : positive for as above, urgency and incontinence, negative for, nocturia, dysuria, frequency, hematuria and dysparunia  Musculoskeletal: negative for, joint pain, joint swelling and muscular weakness  Neurologic: positive for history of CVA  PHQ-9 SCORE 2/16/2018   Total Score 0     JUAN-7 SCORE 6/23/2017 1/4/2018 2/16/2018   Total Score 0 0 0       OBJECTIVE:  Vitals:    02/16/18 0847   BP: 138/74   Pulse: 72   Temp: 98.5  F (36.9  C)   TempSrc: Tympanic   SpO2: 97%   Weight: 162 lb (73.5 kg)   Height: 5' 7\" (1.702 m)     GENERAL APPEARANCE: healthy, alert and no distress  NECK: no adenopathy, no asymmetry, masses, or scars and thyroid normal to palpation  RESP: lungs clear to auscultation - no rales, rhonchi or wheezes  CV: regular rates and rhythm, normal S1 S2, no S3 or S4 and no murmur, click or rub -  ABDOMEN:  soft, nontender, no HSM or masses and bowel sounds normal  MS: extremities normal- no gross " deformities noted, no evidence of inflammation in joints, FROM in all extremities.  PSYCH: mentation appears normal. and affect normal/bright  Results for orders placed or performed in visit on 02/05/18   CK total   Result Value Ref Range    CK Total 108 30 - 225 U/L   CRP inflammation   Result Value Ref Range    CRP Inflammation <2.9 0.0 - 8.0 mg/L   Bilirubin Direct and Total   Result Value Ref Range    Bilirubin Direct 0.2 0.0 - 0.2 mg/dL    Bilirubin Total 1.0 0.2 - 1.3 mg/dL   CBC with platelets and differential   Result Value Ref Range    WBC 3.9 (L) 4.0 - 11.0 10e9/L    RBC Count 5.13 3.8 - 5.2 10e12/L    Hemoglobin 16.3 (H) 11.7 - 15.7 g/dL    Hematocrit 48.2 (H) 35.0 - 47.0 %    MCV 94 78 - 100 fl    MCH 31.8 26.5 - 33.0 pg    MCHC 33.8 31.5 - 36.5 g/dL    RDW 12.5 10.0 - 15.0 %    Platelet Count 194 150 - 450 10e9/L    Diff Method Automated Method     % Neutrophils 57.1 %    % Lymphocytes 25.6 %    % Monocytes 12.7 %    % Eosinophils 3.3 %    % Basophils 0.8 %    % Immature Granulocytes 0.5 %    Nucleated RBCs 0 0 /100    Absolute Neutrophil 2.3 1.6 - 8.3 10e9/L    Absolute Lymphocytes 1.0 0.8 - 5.3 10e9/L    Absolute Monocytes 0.5 0.0 - 1.3 10e9/L    Absolute Eosinophils 0.1 0.0 - 0.7 10e9/L    Absolute Basophils 0.0 0.0 - 0.2 10e9/L    Abs Immature Granulocytes 0.0 0 - 0.4 10e9/L    Absolute Nucleated RBC 0.0      ASSESSMENT/ORDERS:    ICD-10-CM    1. Hyperlipidemia, unspecified hyperlipidemia type E78.5 rosuvastatin (CRESTOR) 5 MG tablet     Lipid Profile (Chol, Trig, HDL, LDL calc)     Hepatic panel     CK total   2. Left sided cerebral hemisphere cerebrovascular accident (H) I63.9 rosuvastatin (CRESTOR) 5 MG tablet   3. Urge incontinence of urine N39.41      PLAN:  Patient Instructions   Labs normalized off the Lipitor.    Start different statin at low dose - Crestor 5 mg.  Recheck labs in 1 month.  Come fasting.  Call if having any side effects.     Did discuss options of urogynecology referral,  trial of OAB agent.  She decided to hold course with incontinence pads at this time.    Is open to trying different statin.      Theodora Thornton

## 2018-02-17 ASSESSMENT — PATIENT HEALTH QUESTIONNAIRE - PHQ9: SUM OF ALL RESPONSES TO PHQ QUESTIONS 1-9: 0

## 2018-02-17 ASSESSMENT — ANXIETY QUESTIONNAIRES: GAD7 TOTAL SCORE: 0

## 2018-03-14 DIAGNOSIS — E78.5 HYPERLIPIDEMIA, UNSPECIFIED HYPERLIPIDEMIA TYPE: ICD-10-CM

## 2018-03-14 LAB
ALBUMIN SERPL-MCNC: 3.9 G/DL (ref 3.4–5)
ALP SERPL-CCNC: 75 U/L (ref 40–150)
ALT SERPL W P-5'-P-CCNC: 24 U/L (ref 0–50)
AST SERPL W P-5'-P-CCNC: 23 U/L (ref 0–45)
BILIRUB DIRECT SERPL-MCNC: 0.3 MG/DL (ref 0–0.2)
BILIRUB SERPL-MCNC: 1.7 MG/DL (ref 0.2–1.3)
CHOLEST SERPL-MCNC: 122 MG/DL
CK SERPL-CCNC: 103 U/L (ref 30–225)
HDLC SERPL-MCNC: 57 MG/DL
LDLC SERPL CALC-MCNC: 50 MG/DL
NONHDLC SERPL-MCNC: 65 MG/DL
PROT SERPL-MCNC: 6.9 G/DL (ref 6.8–8.8)
TRIGL SERPL-MCNC: 73 MG/DL

## 2018-03-14 PROCEDURE — 36415 COLL VENOUS BLD VENIPUNCTURE: CPT | Mod: ZL | Performed by: FAMILY MEDICINE

## 2018-03-14 PROCEDURE — 80061 LIPID PANEL: CPT | Mod: ZL | Performed by: FAMILY MEDICINE

## 2018-03-14 PROCEDURE — 80076 HEPATIC FUNCTION PANEL: CPT | Mod: ZL | Performed by: FAMILY MEDICINE

## 2018-03-14 PROCEDURE — 82550 ASSAY OF CK (CPK): CPT | Mod: ZL | Performed by: FAMILY MEDICINE

## 2018-03-15 ENCOUNTER — TELEPHONE (OUTPATIENT)
Dept: FAMILY MEDICINE | Facility: OTHER | Age: 73
End: 2018-03-15

## 2018-03-15 DIAGNOSIS — E78.5 HYPERLIPIDEMIA, UNSPECIFIED HYPERLIPIDEMIA TYPE: ICD-10-CM

## 2018-03-15 DIAGNOSIS — I63.9 LEFT SIDED CEREBRAL HEMISPHERE CEREBROVASCULAR ACCIDENT (H): ICD-10-CM

## 2018-03-15 RX ORDER — ROSUVASTATIN CALCIUM 5 MG/1
5 TABLET, COATED ORAL DAILY
Qty: 30 TABLET | Refills: 11 | Status: SHIPPED | OUTPATIENT
Start: 2018-03-15 | End: 2019-04-01

## 2018-03-15 NOTE — TELEPHONE ENCOUNTER
Continue current dose.  No need to do labs more than annually unless additional concerns, symptoms, etc.

## 2018-03-15 NOTE — TELEPHONE ENCOUNTER
Dr. Lora,  Called patient this am to tell her about labs.  She in turn is wondering if you can fill her Crestor.  I have pended that for you.  She is also wondering when she should come back in for labs?  Please advise et I will call her and let her know.  Thank you.

## 2018-06-29 ENCOUNTER — TRANSFERRED RECORDS (OUTPATIENT)
Dept: HEALTH INFORMATION MANAGEMENT | Facility: CLINIC | Age: 73
End: 2018-06-29

## 2018-09-13 ENCOUNTER — OFFICE VISIT (OUTPATIENT)
Dept: FAMILY MEDICINE | Facility: OTHER | Age: 73
End: 2018-09-13
Attending: FAMILY MEDICINE
Payer: MEDICARE

## 2018-09-13 VITALS
OXYGEN SATURATION: 96 % | TEMPERATURE: 98.9 F | BODY MASS INDEX: 28.68 KG/M2 | HEIGHT: 64 IN | SYSTOLIC BLOOD PRESSURE: 132 MMHG | WEIGHT: 168 LBS | HEART RATE: 74 BPM | DIASTOLIC BLOOD PRESSURE: 70 MMHG

## 2018-09-13 DIAGNOSIS — N39.46 MIXED INCONTINENCE: ICD-10-CM

## 2018-09-13 DIAGNOSIS — H69.92 ETD (EUSTACHIAN TUBE DYSFUNCTION), LEFT: ICD-10-CM

## 2018-09-13 DIAGNOSIS — G47.33 OSA (OBSTRUCTIVE SLEEP APNEA): ICD-10-CM

## 2018-09-13 DIAGNOSIS — R91.8 PULMONARY NODULES: ICD-10-CM

## 2018-09-13 DIAGNOSIS — I63.9 LEFT SIDED CEREBRAL HEMISPHERE CEREBROVASCULAR ACCIDENT (H): ICD-10-CM

## 2018-09-13 DIAGNOSIS — K21.9 GASTROESOPHAGEAL REFLUX DISEASE, ESOPHAGITIS PRESENCE NOT SPECIFIED: ICD-10-CM

## 2018-09-13 DIAGNOSIS — Z00.00 ROUTINE GENERAL MEDICAL EXAMINATION AT A HEALTH CARE FACILITY: Primary | ICD-10-CM

## 2018-09-13 DIAGNOSIS — R49.0 HOARSE VOICE QUALITY: ICD-10-CM

## 2018-09-13 DIAGNOSIS — E78.5 HYPERLIPIDEMIA, UNSPECIFIED HYPERLIPIDEMIA TYPE: ICD-10-CM

## 2018-09-13 LAB
ALBUMIN SERPL-MCNC: 4 G/DL (ref 3.4–5)
ALBUMIN UR-MCNC: NEGATIVE MG/DL
ALP SERPL-CCNC: 72 U/L (ref 40–150)
ALT SERPL W P-5'-P-CCNC: 20 U/L (ref 0–50)
ANION GAP SERPL CALCULATED.3IONS-SCNC: 4 MMOL/L (ref 3–14)
APPEARANCE UR: CLEAR
AST SERPL W P-5'-P-CCNC: 13 U/L (ref 0–45)
BASOPHILS # BLD AUTO: 0 10E9/L (ref 0–0.2)
BASOPHILS NFR BLD AUTO: 0.7 %
BILIRUB SERPL-MCNC: 1.6 MG/DL (ref 0.2–1.3)
BILIRUB UR QL STRIP: NEGATIVE
BUN SERPL-MCNC: 16 MG/DL (ref 7–30)
CALCIUM SERPL-MCNC: 8.9 MG/DL (ref 8.5–10.1)
CHLORIDE SERPL-SCNC: 105 MMOL/L (ref 94–109)
CHOLEST SERPL-MCNC: 134 MG/DL
CK SERPL-CCNC: 120 U/L (ref 30–225)
CO2 SERPL-SCNC: 31 MMOL/L (ref 20–32)
COLOR UR AUTO: YELLOW
CREAT SERPL-MCNC: 0.95 MG/DL (ref 0.52–1.04)
DIFFERENTIAL METHOD BLD: NORMAL
EOSINOPHIL # BLD AUTO: 0.1 10E9/L (ref 0–0.7)
EOSINOPHIL NFR BLD AUTO: 1.5 %
ERYTHROCYTE [DISTWIDTH] IN BLOOD BY AUTOMATED COUNT: 12.6 % (ref 10–15)
GFR SERPL CREATININE-BSD FRML MDRD: 58 ML/MIN/1.7M2
GLUCOSE SERPL-MCNC: 95 MG/DL (ref 70–99)
GLUCOSE UR STRIP-MCNC: NEGATIVE MG/DL
HCT VFR BLD AUTO: 46.4 % (ref 35–47)
HDLC SERPL-MCNC: 59 MG/DL
HGB BLD-MCNC: 15.7 G/DL (ref 11.7–15.7)
HGB UR QL STRIP: NEGATIVE
IMM GRANULOCYTES # BLD: 0 10E9/L (ref 0–0.4)
IMM GRANULOCYTES NFR BLD: 0.2 %
KETONES UR STRIP-MCNC: NEGATIVE MG/DL
LDLC SERPL CALC-MCNC: 56 MG/DL
LEUKOCYTE ESTERASE UR QL STRIP: NEGATIVE
LYMPHOCYTES # BLD AUTO: 1.1 10E9/L (ref 0.8–5.3)
LYMPHOCYTES NFR BLD AUTO: 23.7 %
MCH RBC QN AUTO: 31.5 PG (ref 26.5–33)
MCHC RBC AUTO-ENTMCNC: 33.8 G/DL (ref 31.5–36.5)
MCV RBC AUTO: 93 FL (ref 78–100)
MONOCYTES # BLD AUTO: 0.5 10E9/L (ref 0–1.3)
MONOCYTES NFR BLD AUTO: 10 %
NEUTROPHILS # BLD AUTO: 2.9 10E9/L (ref 1.6–8.3)
NEUTROPHILS NFR BLD AUTO: 63.9 %
NITRATE UR QL: NEGATIVE
NONHDLC SERPL-MCNC: 75 MG/DL
NRBC # BLD AUTO: 0 10*3/UL
NRBC BLD AUTO-RTO: 0 /100
PH UR STRIP: 6 PH (ref 4.7–8)
PLATELET # BLD AUTO: 196 10E9/L (ref 150–450)
POTASSIUM SERPL-SCNC: 4.3 MMOL/L (ref 3.4–5.3)
PROT SERPL-MCNC: 7.1 G/DL (ref 6.8–8.8)
RBC # BLD AUTO: 4.99 10E12/L (ref 3.8–5.2)
SODIUM SERPL-SCNC: 140 MMOL/L (ref 133–144)
SOURCE: NORMAL
SP GR UR STRIP: 1.01 (ref 1–1.03)
TRIGL SERPL-MCNC: 96 MG/DL
UROBILINOGEN UR STRIP-MCNC: NORMAL MG/DL (ref 0–2)
WBC # BLD AUTO: 4.5 10E9/L (ref 4–11)

## 2018-09-13 PROCEDURE — 85025 COMPLETE CBC W/AUTO DIFF WBC: CPT | Mod: ZL | Performed by: FAMILY MEDICINE

## 2018-09-13 PROCEDURE — 82550 ASSAY OF CK (CPK): CPT | Mod: ZL | Performed by: FAMILY MEDICINE

## 2018-09-13 PROCEDURE — 81003 URINALYSIS AUTO W/O SCOPE: CPT | Mod: ZL | Performed by: FAMILY MEDICINE

## 2018-09-13 PROCEDURE — 80053 COMPREHEN METABOLIC PANEL: CPT | Mod: ZL | Performed by: FAMILY MEDICINE

## 2018-09-13 PROCEDURE — 99397 PER PM REEVAL EST PAT 65+ YR: CPT | Performed by: FAMILY MEDICINE

## 2018-09-13 PROCEDURE — 36415 COLL VENOUS BLD VENIPUNCTURE: CPT | Mod: ZL | Performed by: FAMILY MEDICINE

## 2018-09-13 PROCEDURE — 80061 LIPID PANEL: CPT | Mod: ZL | Performed by: FAMILY MEDICINE

## 2018-09-13 RX ORDER — ACETAMINOPHEN 160 MG
2000 TABLET,DISINTEGRATING ORAL DAILY
COMMUNITY

## 2018-09-13 RX ORDER — FLUTICASONE PROPIONATE 50 MCG
1-2 SPRAY, SUSPENSION (ML) NASAL DAILY
Qty: 1 BOTTLE | Refills: 3 | Status: SHIPPED | OUTPATIENT
Start: 2018-09-13 | End: 2018-09-13

## 2018-09-13 ASSESSMENT — PAIN SCALES - GENERAL: PAINLEVEL: NO PAIN (0)

## 2018-09-13 ASSESSMENT — ANXIETY QUESTIONNAIRES
5. BEING SO RESTLESS THAT IT IS HARD TO SIT STILL: NOT AT ALL
7. FEELING AFRAID AS IF SOMETHING AWFUL MIGHT HAPPEN: NOT AT ALL
1. FEELING NERVOUS, ANXIOUS, OR ON EDGE: NOT AT ALL
6. BECOMING EASILY ANNOYED OR IRRITABLE: NOT AT ALL
4. TROUBLE RELAXING: NOT AT ALL
GAD7 TOTAL SCORE: 0
2. NOT BEING ABLE TO STOP OR CONTROL WORRYING: NOT AT ALL
3. WORRYING TOO MUCH ABOUT DIFFERENT THINGS: NOT AT ALL

## 2018-09-13 NOTE — NURSING NOTE
"Chief Complaint   Patient presents with     Physical     Lipids       Initial /70 (BP Location: Right arm, Patient Position: Chair, Cuff Size: Adult Large)  Pulse 74  Temp 98.9  F (37.2  C) (Tympanic)  Ht 5' 4\" (1.626 m)  Wt 168 lb (76.2 kg)  SpO2 96%  BMI 28.84 kg/m2 Estimated body mass index is 28.84 kg/(m^2) as calculated from the following:    Height as of this encounter: 5' 4\" (1.626 m).    Weight as of this encounter: 168 lb (76.2 kg).  Medication Reconciliation: complete    Yanely Willson MA  "

## 2018-09-13 NOTE — PROGRESS NOTES
SUBJECTIVE:   Emily Olson is a 73 year old female who presents for Preventive Visit.    Are you in the first 12 months of your Medicare Part B coverage?  No    Healthy Habits:    Do you get at least three servings of calcium containing foods daily (dairy, green leafy vegetables, etc.)? yes    Amount of exercise or daily activities, outside of work: 5 day(s) per week; walks    Problems taking medications regularly No    Medication side effects: No    Have you had an eye exam in the past two years? yes    Do you see a dentist twice per year? yes    Do you have sleep apnea, excessive snoring or daytime drowsiness?yes- sleep apnea - uses CPAP - but doesn't tolerate it all night - ends up taking it off; is due for follow up with DR. Naavrro      Ability to successfully perform activities of daily living: Yes, no assistance needed    Home safety:  none identified     Hearing impairment: yes- has hearing aid     Fall risk:    Fallen 2 or more times in the past year?: No  Any fall with injury in the past year?: No    Mammogram 6/29/18 at [facility].    Notes hoarseness of voice intermittently - wonders if it was Plavix.  History of esophageal dilation in 2015.  No current dysphagia, no odynophagia.  Does have some postnasal drainage.      Requesting urology consult, Dr. Salas, [facility], for urinary incontinence.  Daily liner/pad.  Notes stress incontinence, nocturia 0-3 times per day, urgency.  No dysuria, no pain.  No recent UTI.  No prior bladder medications.  Declines medication trial.    COGNITIVE SCREEN  1) Repeat 3 items (Leader, Season, Table)    2) Clock draw: NORMAL  3) 3 item recall: Recalls 3 objects  Results: NORMAL clock, 1-2 items recalled: COGNITIVE IMPAIRMENT LESS LIKELY    Mini-CogTM Copyright S Violeta. Licensed by the author for use in [place] [employer]; reprinted with permission (jono@.[email or web address]). All rights reserved.        Hyperlipidemia Follow-Up      Rate your low fat/cholesterol diet?:  not monitoring fat    Taking statin?  Yes, possible muscle aches from statin    Other lipid medications/supplements?:  none      Reviewed and updated as needed this visit by clinical staff  Tobacco  Allergies  Meds  Med Hx  Surg Hx  Fam Hx  Soc Hx        Reviewed and updated as needed this visit by Provider  Tobacco  Allergies  Meds  Med Hx  Surg Hx  Fam Hx  Soc Hx       Social History   Substance Use Topics     Smoking status: Never Smoker     Smokeless tobacco: Never Used     Alcohol use No       If you drink alcohol do you typically have >3 drinks per day or >7 drinks per week? No                        Today's PHQ-2 Score: No flowsheet data found.    Do you feel safe in your environment - Yes    Do you have a Health Care Directive?: Yes: Patient states has Advance Directive and will bring in a copy to clinic.    Current providers sharing in care for this patient include:   Patient Care Team:  Theodora Lora MD as PCP - General (Family Practice)    The following health maintenance items are reviewed in Epic and correct as of today:  Health Maintenance   Topic Date Due     JUAN QUESTIONNAIRE 6 MONTHS  08/16/2018     PHQ-9 Q6 MONTHS  08/16/2018     INFLUENZA VACCINE (1) 09/01/2018     FALL RISK ASSESSMENT  02/16/2019     COLON CANCER SCREEN (SYSTEM ASSIGNED)  10/22/2020     ADVANCE DIRECTIVE PLANNING Q5 YRS  06/23/2022     TETANUS IMMUNIZATION (SYSTEM ASSIGNED)  07/08/2022     LIPID SCREEN Q5 YR FEMALE (SYSTEM ASSIGNED)  03/14/2023     DEXA SCAN SCREENING (SYSTEM ASSIGNED)  Completed     PNEUMOCOCCAL  Addressed     Current Outpatient Prescriptions   Medication     Calcium-Vitamin D-Vitamin K (CHEWABLE CALCIUM PO)     Cholecalciferol (VITAMIN D3) 2000 units CAPS     clopidogrel (PLAVIX) 75 MG tablet     fluticasone (FLONASE) 50 MCG/ACT spray     rosuvastatin (CRESTOR) 5 MG tablet     No current facility-administered medications for this visit.        Labs reviewed in EPIC    Pneumonia Vaccine:up to  "date  Mammogram Screening: Done 6/2018 [facility]  History of abnormal Pap smear: NO - age 63 - see link Cervical Cytology Screening Guidelines    ROS:  Constitutional, HEENT, cardiovascular, pulmonary, gi and gu systems are negative, except as otherwise noted.    OBJECTIVE:   /70 (BP Location: Right arm, Patient Position: Chair, Cuff Size: Adult Large)  Pulse 74  Temp 98.9  F (37.2  C) (Tympanic)  Ht 5' 4\" (1.626 m)  Wt 168 lb (76.2 kg)  SpO2 96%  BMI 28.84 kg/m2 Estimated body mass index is 28.84 kg/(m^2) as calculated from the following:    Height as of this encounter: 5' 4\" (1.626 m).    Weight as of this encounter: 168 lb (76.2 kg).  EXAM:   GENERAL APPEARANCE: healthy, alert and no distress  EYES: Eyes grossly normal to inspection, PERRL and conjunctivae and sclerae normal  HENT: ear canals normal; right TM normal; left TM with scattered reflex, dull; nose and mouth without ulcers or lesions, oropharynx clear and oral mucous membranes moist  NECK: no adenopathy, no asymmetry, masses, or scars and thyroid normal to palpation  RESP: lungs clear to auscultation - no rales, rhonchi or wheezes  BREAST: normal without masses, tenderness or nipple discharge and no palpable axillary masses or adenopathy  CV: regular rate and rhythm, normal S1 S2, no S3 or S4, no murmur, click or rub, no peripheral edema and peripheral pulses strong  ABDOMEN: soft, nontender, no hepatosplenomegaly, no masses and bowel sounds normal  MS: no musculoskeletal defects are noted and gait is age appropriate without ataxia  SKIN: no suspicious lesions or rashes  NEURO: Normal strength and tone, sensory exam grossly normal, mentation intact and speech normal  PSYCH: mentation appears normal and affect normal/bright      ASSESSMENT / PLAN:       ICD-10-CM    1. Routine general medical examination at a health care facility Z00.00 CBC with platelets and differential     Comprehensive metabolic panel   2. Gastroesophageal reflux disease, " "esophagitis presence not specified K21.9 CBC with platelets and differential   3. Hyperlipidemia, unspecified hyperlipidemia type E78.5 Comprehensive metabolic panel     CK total     Lipid Profile (Chol, Trig, HDL, LDL calc)   4. Left sided cerebral hemisphere cerebrovascular accident (H) I63.9 CBC with platelets and differential     Comprehensive metabolic panel     Lipid Profile (Chol, Trig, HDL, LDL calc)   5. Mixed incontinence N39.46 UA reflex to Microscopic and Culture     UROLOGY ADULT REFERRAL   6. ETD (Eustachian tube dysfunction), left H69.82 fluticasone (FLONASE) 50 MCG/ACT spray   7. Hoarse voice quality R49.0 fluticasone (FLONASE) 50 MCG/ACT spray   8. MONET (obstructive sleep apnea) G47.33 PULMONARY MEDICINE REFERRAL   9. Pulmonary nodules R91.8 PULMONARY MEDICINE REFERRAL     CANCELED: CT Chest w Contrast     Will call with lab results.  Mammogram done [facility].    Repeat CT lung ordered - following up nodules.  Referral back to Dr. Navarro for follow up.  Referral to urology Dr Salas for incontinence.  Trial of daily Flonase nasal spray - to treat left ear as well as hoarse voice.  Consider ENT consult if not improving.    End of Life Planning:  Patient currently has an advanced directive: Yes.  Practitioner is supportive of decision.    COUNSELING:  Reviewed preventive health counseling, as reflected in patient instructions       Regular exercise       Healthy diet/nutrition       Vision screening       Hearing screening       Dental care       Immunizations       Alcohol Use       Osteoporosis Prevention/Bone Health       Colon cancer screening       Advanced Planning     BP Readings from Last 1 Encounters:   09/13/18 132/70     Estimated body mass index is 28.84 kg/(m^2) as calculated from the following:    Height as of this encounter: 5' 4\" (1.626 m).    Weight as of this encounter: 168 lb (76.2 kg).    BP Screening:   Last 3 BP Readings:    BP Readings from Last 3 Encounters:   09/13/18 " 132/70   02/16/18 138/74   01/04/18 138/74       The following was recommended to the patient:  Re-screen BP within a year and recommended lifestyle modifications  Weight management plan: Patient referred to endocrine and/or weight management specialty     reports that she has never smoked. She has never used smokeless tobacco.      Appropriate preventive services were discussed with this patient, including applicable screening as appropriate for cardiovascular disease, diabetes, osteopenia/osteoporosis, and glaucoma.  As appropriate for age/gender, discussed screening for colorectal cancer, prostate cancer, breast cancer, and cervical cancer. Checklist reviewing preventive services available has been given to the patient.    Reviewed patients plan of care and provided an AVS. The Basic Care Plan (routine screening as documented in Health Maintenance) for Emily meets the Care Plan requirement. This Care Plan has been established and reviewed with the Patient.    Counseling Resources:  ATP IV Guidelines  Pooled Cohorts Equation Calculator  Breast Cancer Risk Calculator  FRAX Risk Assessment  ICSI Preventive Guidelines  Dietary Guidelines for Americans, 2010  USDA's MyPlate  ASA Prophylaxis  Lung CA Screening    Theodora Thornton MD  [facility]

## 2018-09-13 NOTE — MR AVS SNAPSHOT
After Visit Summary   9/13/2018    Emily Olson    MRN: [id number]           Patient Information     Date Of Birth          1945        Visit Information        Provider Department      9/13/2018 9:15 AM Theodora Lora MD [facility]        Today's Diagnoses     Routine general medical examination at a health care facility    -  1    Gastroesophageal reflux disease, esophagitis presence not specified        Hyperlipidemia, unspecified hyperlipidemia type        Left sided cerebral hemisphere cerebrovascular accident (H)        Mixed incontinence        ETD (Eustachian tube dysfunction), left        Hoarse voice quality        MONET (obstructive sleep apnea)        Pulmonary nodules          Care Instructions    Will call with lab results.  Mammogram done [facility].    Repeat CT lung ordered - following up nodules.  Referral back to Dr. Navarro for follow up.  Referral to urology Dr Salas for incontinence.  Trial of daily Flonase nasal spray - to treat left ear as well as hoarse voice.  Consider ENT consult if not improving.    Preventive Health Recommendations  Female Ages 65 +    Yearly exam:     See your health care provider every year in order to  o Review health changes.   o Discuss preventive care.    o Review your medicines if your doctor has prescribed any.      You no longer need a yearly Pap test unless you've had an abnormal Pap test in the past 10 years. If you have vaginal symptoms, such as bleeding or discharge, be sure to talk with your provider about a Pap test.      Every 1 to 2 years, have a mammogram.  If you are over 69, talk with your health care provider about whether or not you want to continue having screening mammograms.      Every 10 years, have a colonoscopy. Or, have a yearly FIT test (stool test). These exams will check for colon cancer.       Have a cholesterol test every 5 years, or more often if your doctor advises it.       Have a  diabetes test (fasting glucose) every three years. If you are at risk for diabetes, you should have this test more often.       At age 65, have a bone density scan (DEXA) to check for osteoporosis (brittle bone disease).    Shots:    Get a flu shot each year.    Get a tetanus shot every 10 years.    Talk to your doctor about your pneumonia vaccines. There are now two you should receive - Pneumovax (PPSV 23) and Prevnar (PCV 13).    Talk to your pharmacist about the shingles vaccine.    Talk to your doctor about the hepatitis B vaccine.    Nutrition:     Eat at least 5 servings of fruits and vegetables each day.      Eat whole-grain bread, whole-wheat pasta and brown rice instead of white grains and rice.      Get adequate about Calcium and Vitamin D.     Lifestyle    Exercise at least 150 minutes a week (30 minutes a day, 5 days a week). This will help you control your weight and prevent disease.      Limit alcohol to one drink per day.      No smoking.       Wear sunscreen to prevent skin cancer.       See your dentist twice a year for an exam and cleaning.      See your eye doctor every 1 to 2 years to screen for conditions such as glaucoma, macular degeneration, cataracts, etc           Follow-ups after your visit        Additional Services     PULMONARY MEDICINE REFERRAL       Your provider has referred you to: Dr Navarro - follow up    Please be aware that coverage of these services is subject to the terms and limitations of your health insurance plan.  Call member services at your health plan with any benefit or coverage questions.      Please bring the following with you to your appointment:    (1) Any X-Rays, CTs or MRIs which have been performed.  Contact the facility where they were done to arrange for  prior to your scheduled appointment.    (2) List of current medications   (3) This referral request   (4) Any documents/labs given to you for this referral            UROLOGY ADULT REFERRAL        "Your provider has referred you to: [facility] per patient requests (sees  too)    Please be aware that coverage of these services is subject to the terms and limitations of your health insurance plan.  Call member services at your health plan with any benefit or coverage questions.      Please bring the following with you to your appointment:    (1) Any X-Rays, CTs or MRIs which have been performed.  Contact the facility where they were done to arrange for  prior to your scheduled appointment.    (2) List of current medications  (3) This referral request   (4) Any documents/labs given to you for this referral                  Who to contact     If you have questions or need follow up information about today's clinic visit or your schedule please contact [facility] directly at [phone].  Normal or non-critical lab and imaging results will be communicated to you by MyChart, letter or phone within 4 business days after the clinic has received the results. If you do not hear from us within 7 days, please contact the clinic through MyChart or phone. If you have a critical or abnormal lab result, we will notify you by phone as soon as possible.  Submit refill requests through [employer] or call your pharmacy and they will forward the refill request to us. Please allow 3 business days for your refill to be completed.          Additional Information About Your Visit        Care EveryWhere ID     This is your Care EveryWhere ID. This could be used by other organizations to access your [facility] medical records  [id number]        Your Vitals Were     Pulse Temperature Height Pulse Oximetry BMI (Body Mass Index)       74 98.9  F (37.2  C) (Tympanic) 5' 4\" (1.626 m) 96% 28.84 kg/m2        Blood Pressure from Last 3 Encounters:   09/13/18 132/70   02/16/18 138/74   01/04/18 138/74    Weight from Last 3 Encounters:   09/13/18 168 lb (76.2 kg)   02/16/18 162 lb (73.5 kg)   01/04/18 164 lb 3.2 " oz (74.5 kg)              We Performed the Following     CBC with platelets and differential     CK total     Comprehensive metabolic panel     CT Chest w Contrast     Lipid Profile (Chol, Trig, HDL, LDL calc)     PULMONARY MEDICINE REFERRAL     UA reflex to Microscopic and Culture     UROLOGY ADULT REFERRAL          Today's Medication Changes          These changes are accurate as of 9/13/18  9:53 AM.  If you have any questions, ask your nurse or doctor.               Start taking these medicines.        Dose/Directions    fluticasone 50 MCG/ACT spray   Commonly known as:  FLONASE   Used for:  ETD (Eustachian tube dysfunction), left, Hoarse voice quality   Started by:  Theodora Lora MD        Dose:  1-2 spray   Spray 1-2 sprays into both nostrils daily   Quantity:  1 Bottle   Refills:  3            Where to get your medicines      These medications were sent to [facility] [id number] [place]  AT [facility] [address] [place] , [facility] [address]     Phone:  [phone]     fluticasone 50 MCG/ACT spray                Primary Care Provider Office Phone # Fax #    Theodora Lora [phone] [address] [facility] [address]        Equal Access to Services     WILLAM PHILIP AH: Hadii aad ku hadasho Soomaali, waaxda luqadaha, qaybta kaalmada adeegyada, waxay ameyain haynereidan troy jarrell. So [facility] [phone].    ATENCIÓN: Si habla español, tiene a torres disposición servicios gratuitos de asistencia lingüística. Llame al [phone].    We comply with applicable federal civil rights laws and [place] laws. We do not discriminate on the basis of race, color, national origin, age, disability, sex, sexual orientation, or gender identity.            Thank you!     Thank you for choosing [facility]  for your care. Our goal is always to provide you with excellent care. Hearing back from our patients is one way we can  continue to improve our services. Please take a few minutes to complete the written survey that you may receive in the mail after your visit with us. Thank you!             Your Updated Medication List - Protect others around you: Learn how to safely use, store and throw away your medicines at www.disposemymeds.org.          This list is accurate as of 9/13/18  9:53 AM.  Always use your most recent med list.                   Brand Name Dispense Instructions for use Diagnosis    CHEWABLE CALCIUM PO      Take 1,000 mg by mouth daily        clopidogrel 75 MG tablet    PLAVIX    30 tablet    Take 1 tablet (75 mg) by mouth daily    Left sided cerebral hemisphere cerebrovascular accident (H)       fluticasone 50 MCG/ACT spray    FLONASE    1 Bottle    Spray 1-2 sprays into both nostrils daily    ETD (Eustachian tube dysfunction), left, Hoarse voice quality       rosuvastatin 5 MG tablet    CRESTOR    30 tablet    Take 1 tablet (5 mg) by mouth daily    Hyperlipidemia, unspecified hyperlipidemia type, Left sided cerebral hemisphere cerebrovascular accident (H)       vitamin D3 2000 units Caps      Take 2,000 Units by mouth daily

## 2018-09-13 NOTE — PATIENT INSTRUCTIONS
Will call with lab results.  Mammogram done [facility].    Repeat CT lung ordered - following up nodules.  Referral back to Dr. Navarro for follow up.  Referral to urology Dr Salas for incontinence.  Trial of daily Flonase nasal spray - to treat left ear as well as hoarse voice.  Consider ENT consult if not improving.    Preventive Health Recommendations  Female Ages 65 +    Yearly exam:     See your health care provider every year in order to  o Review health changes.   o Discuss preventive care.    o Review your medicines if your doctor has prescribed any.      You no longer need a yearly Pap test unless you've had an abnormal Pap test in the past 10 years. If you have vaginal symptoms, such as bleeding or discharge, be sure to talk with your provider about a Pap test.      Every 1 to 2 years, have a mammogram.  If you are over 69, talk with your health care provider about whether or not you want to continue having screening mammograms.      Every 10 years, have a colonoscopy. Or, have a yearly FIT test (stool test). These exams will check for colon cancer.       Have a cholesterol test every 5 years, or more often if your doctor advises it.       Have a diabetes test (fasting glucose) every three years. If you are at risk for diabetes, you should have this test more often.       At age 65, have a bone density scan (DEXA) to check for osteoporosis (brittle bone disease).    Shots:    Get a flu shot each year.    Get a tetanus shot every 10 years.    Talk to your doctor about your pneumonia vaccines. There are now two you should receive - Pneumovax (PPSV 23) and Prevnar (PCV 13).    Talk to your pharmacist about the shingles vaccine.    Talk to your doctor about the hepatitis B vaccine.    Nutrition:     Eat at least 5 servings of fruits and vegetables each day.      Eat whole-grain bread, whole-wheat pasta and brown rice instead of white grains and rice.      Get adequate about Calcium and Vitamin D.      Lifestyle    Exercise at least 150 minutes a week (30 minutes a day, 5 days a week). This will help you control your weight and prevent disease.      Limit alcohol to one drink per day.      No smoking.       Wear sunscreen to prevent skin cancer.       See your dentist twice a year for an exam and cleaning.      See your eye doctor every 1 to 2 years to screen for conditions such as glaucoma, macular degeneration, cataracts, etc

## 2018-09-14 ASSESSMENT — PATIENT HEALTH QUESTIONNAIRE - PHQ9: SUM OF ALL RESPONSES TO PHQ QUESTIONS 1-9: 0

## 2018-09-14 ASSESSMENT — ANXIETY QUESTIONNAIRES: GAD7 TOTAL SCORE: 0

## 2018-09-20 ENCOUNTER — HOSPITAL ENCOUNTER (OUTPATIENT)
Dept: CT IMAGING | Facility: HOSPITAL | Age: 73
Discharge: HOME | End: 2018-09-20
Attending: FAMILY MEDICINE | Admitting: FAMILY MEDICINE
Payer: MEDICARE

## 2018-09-20 DIAGNOSIS — R91.8 PULMONARY NODULES: ICD-10-CM

## 2018-09-20 PROCEDURE — 71250 CT THORAX DX C-: CPT | Mod: TC

## 2018-11-26 ENCOUNTER — TRANSFERRED RECORDS (OUTPATIENT)
Dept: HEALTH INFORMATION MANAGEMENT | Facility: CLINIC | Age: 73
End: 2018-11-26

## 2018-12-18 NOTE — PROGRESS NOTES
[facility] [place]  [address]  [place] [address]  [phone]  Dept: [phone]    PRE-OP EVALUATION:  Today's date: 2018    Emily Olson (: 1945) presents for pre-operative evaluation assessment as requested by Dr. Jessica Le.  She requires evaluation and anesthesia risk assessment prior to undergoing surgery/procedure for treatment of Left Eye Cataract and later right eye cataract.    Proposed Surgery/ Procedure: Left Eye Cataract, then right eye cataract   Date of Surgery/ Procedure: 1/3/19, 19  Time of Surgery/ Procedure: [facility]  Hospital/Surgical Facility: [facility]  Primary Physician: Theodora Lora  Type of Anesthesia Anticipated: to be determined    Patient has a Health Care Directive or Living Will:  YES     1.YES - Do you have a history of heart attack, stroke, stent, bypass or surgery on an artery in the head, neck, heart or legs? 2017; maintained on Plavix  2. NO - Do you ever have any pain or discomfort in your chest?  3. NO - Do you have a history of  Heart Failure?  4. NO - Are you troubled by shortness of breath when: walking on the level, up a slight hill or at night?  5. NO - Do you currently have a cold, bronchitis or other respiratory infection?  6. NO - Do you have a cough, shortness of breath or wheezing?  7. NO - Do you sometimes get pains in the calves of your legs when you walk?  8. NO - Do you or anyone in your family have previous history of blood clots?  9. NO - Do you or does anyone in your family have a serious bleeding problem such as prolonged bleeding following surgeries or cuts?  10. NO - Have you ever had problems with anemia or been told to take iron pills?  11. NO - Have you had any abnormal blood loss such as black, tarry or bloody stools, or abnormal vaginal bleeding?  12. NO - Have you ever had a blood transfusion?  13. NO - Have you or any of your relatives ever had problems with anesthesia?  14. YES  - Do you have sleep apnea, excessive snoring or daytime drowsiness?  Sleep apnea, but doesn't use CPAP  15. NO - Do you have any prosthetic heart valves?  16. NO - Do you have prosthetic joints?  17. NO - Is there any chance that you may be pregnant?      HPI:     HPI related to upcoming procedure: Bilateral cataracts.      See problem list for active medical problems.  Problems all longstanding and stable, except as noted/documented.  See ROS for pertinent symptoms related to these conditions.                                                                                                                                                          .    MEDICAL HISTORY:     Patient Active Problem List    Diagnosis Date Noted     ACP (advance care planning) 06/23/2017     Priority: Medium     Advance Care Planning 6/23/2017: ACP Review of Chart / Resources Provided:  Reviewed chart for advance care plan.  Emily Olson has an advance care plan which needs to be updated. Patient states presence of new/updated ACP document. Copy requested  Added by Tessie Quevedo             Left carotid artery occlusion 06/23/2017     Priority: Medium     Left sided cerebral hemisphere cerebrovascular accident (H) 06/23/2017     Priority: Medium     Bilateral lower extremity edema      Priority: Medium     Trochanteric bursitis, right hip      Priority: Medium     Urge incontinence      Priority: Medium     Pulmonary nodule      Priority: Medium     GERD (gastroesophageal reflux disease)      Priority: Medium     Osteoporosis      Priority: Medium     Hyperlipidemia, unspecified      Priority: Medium      Past Medical History:   Diagnosis Date     Bilateral lower extremity edema      CVA (cerebral vascular accident) (H) 2017    watershed stroke, left hemisphere; occlusion left carotid on CTA; PT/OT/Speech; Plavix/statin; Dr. Aleman     Encephalopathy     6/2017; ER, Dr Still, [place], [place]; slowed speech and responses; ?ischemic vs  dementia; ordered echo, MRI, carotid u/s; neuro referral; MMSE 26/30     GERD (gastroesophageal reflux disease)     esophageal dilation 2015     Hyperlipidemia, unspecified      MONET (obstructive sleep apnea)     severe; sleep study 1/17/18; CPAP; Dr. Navarro     Osteoporosis     Forteo injections; prior Fosamax and Boniva with GI side effects; prior Evista, Miacalcin;      Pulmonary embolism (H) 2014    [facility]     Pulmonary nodule     CT 5/2017; repeat 9 months; Dr. Navarro     Sebaceous cyst of labia      Trochanteric bursitis, right hip      Urge incontinence      Past Surgical History:   Procedure Laterality Date     breast biopsy/benign      LT     COLONOSCOPY  10/22/2010    normal; Dr. Livingston; repeat 10 years     total abdominal hysterectomy  1976    cervix removed as well; [place]/BSO for endometriosis     Current Outpatient Medications   Medication Sig Dispense Refill     Calcium-Vitamin D-Vitamin K (CHEWABLE CALCIUM PO) Take 1,000 mg by mouth daily       Cholecalciferol (VITAMIN D3) 2000 units CAPS Take 2,000 Units by mouth daily       clopidogrel (PLAVIX) 75 MG tablet Take 1 tablet (75 mg) by mouth daily 30 tablet 3     fluticasone (FLONASE) 50 MCG/ACT spray SHAKE LIQUID AND USE 1 TO 2 SPRAYS IN EACH NOSTRIL DAILY 48 mL 0     rosuvastatin (CRESTOR) 5 MG tablet Take 1 tablet (5 mg) by mouth daily 30 tablet 11     OTC products: None, except as noted above    Allergies   Allergen Reactions     Animal Dander      Cat and dog     Cats      Dust Mites Other (See Comments)     House Dust       Grass Other (See Comments)     Grass Poll-Perennial [place], STD       Sulfa Drugs       Latex Allergy: NO    Social History     Tobacco Use     Smoking status: Never Smoker     Smokeless tobacco: Never Used   Substance Use Topics     Alcohol use: No     History   Drug Use No       REVIEW OF SYSTEMS:   Constitutional, neuro, ENT, endocrine, pulmonary, cardiac, gastrointestinal, genitourinary,  musculoskeletal, integument and psychiatric systems are negative, except as otherwise noted.    EXAM:   /74 (BP Location: Right arm, Patient Position: Sitting, Cuff Size: Adult Regular)   Pulse 85   Wt 77.3 kg (170 lb 6.4 oz)   SpO2 97%   BMI 29.25 kg/m      GENERAL APPEARANCE: healthy, alert and no distress     EYES: EOMI, PERRL     HENT: ear canals and TM's normal and nose and mouth without ulcers or lesions     HENT: hearing aids bilaterally; removed for exam     NECK: no adenopathy, no asymmetry, masses, or scars and thyroid normal to palpation     RESP: lungs clear to auscultation - no rales, rhonchi or wheezes     CV: regular rates and rhythm, normal S1 S2, no S3 or S4 and no murmur, click or rub     ABDOMEN:  soft, nontender, no HSM or masses and bowel sounds normal     MS: extremities normal- no gross deformities noted, no evidence of inflammation in joints, FROM in all extremities.     SKIN: no suspicious lesions or rashes     NEURO: Normal strength and tone, sensory exam grossly normal, mentation intact and speech normal     PSYCH: mentation appears normal. and affect normal/bright     LYMPHATICS: No cervical adenopathy    DIAGNOSTICS:   No labs or EKG required for low risk surgery (cataract, skin procedure, breast biopsy, etc)    Recent Labs   Lab Test 09/13/18  1003 02/05/18  0920 01/04/18  1129   HGB 15.7 16.3* 16.6*    194 175     --  141   POTASSIUM 4.3  --  4.2   CR 0.95  --  0.84        IMPRESSION:   Reason for surgery/procedure: cataracts, bilateral  Diagnosis/reason for consult: cardiopulmonary clearance    The proposed surgical procedure is considered LOW risk.    REVISED CARDIAC RISK INDEX  The patient has the following serious cardiovascular risks for perioperative complications such as (MI, PE, VFib and 3  AV Block):  Cerebrovascular Disease (TIA or CVA)  INTERPRETATION: 1 risks: Class II (low risk - 0.9% complication rate)    The patient has the following additional  risks for perioperative complications:  No identified additional risks      ICD-10-CM    1. Preop general physical exam Z01.818      Doing well.  Stable.  No concerns.    RECOMMENDATIONS:         --Patient is to take all scheduled medications on the day of surgery EXCEPT for modifications listed below.    Anticoagulant or Antiplatelet Medication Use  PLAVIX: ok to continue per Dr. Le's letter        APPROVAL GIVEN to proceed with proposed procedure, without further diagnostic evaluation       Signed Electronically by: Theodora Thornton MD    Copy of this evaluation report is provided to requesting physician.    [place] Preop Guidelines    Revised Cardiac Risk Index

## 2018-12-20 ENCOUNTER — OFFICE VISIT (OUTPATIENT)
Dept: FAMILY MEDICINE | Facility: OTHER | Age: 73
End: 2018-12-20
Attending: FAMILY MEDICINE
Payer: MEDICARE

## 2018-12-20 VITALS
WEIGHT: 170.4 LBS | BODY MASS INDEX: 29.25 KG/M2 | HEART RATE: 85 BPM | DIASTOLIC BLOOD PRESSURE: 74 MMHG | SYSTOLIC BLOOD PRESSURE: 134 MMHG | OXYGEN SATURATION: 97 %

## 2018-12-20 DIAGNOSIS — Z01.818 PREOP GENERAL PHYSICAL EXAM: Primary | ICD-10-CM

## 2018-12-20 PROCEDURE — G0463 HOSPITAL OUTPT CLINIC VISIT: HCPCS

## 2018-12-20 PROCEDURE — 99214 OFFICE O/P EST MOD 30 MIN: CPT | Performed by: FAMILY MEDICINE

## 2018-12-20 ASSESSMENT — PATIENT HEALTH QUESTIONNAIRE - PHQ9: SUM OF ALL RESPONSES TO PHQ QUESTIONS 1-9: 0

## 2018-12-20 ASSESSMENT — ANXIETY QUESTIONNAIRES
1. FEELING NERVOUS, ANXIOUS, OR ON EDGE: NOT AT ALL
5. BEING SO RESTLESS THAT IT IS HARD TO SIT STILL: NOT AT ALL
7. FEELING AFRAID AS IF SOMETHING AWFUL MIGHT HAPPEN: NOT AT ALL
6. BECOMING EASILY ANNOYED OR IRRITABLE: NOT AT ALL
3. WORRYING TOO MUCH ABOUT DIFFERENT THINGS: NOT AT ALL
2. NOT BEING ABLE TO STOP OR CONTROL WORRYING: NOT AT ALL
4. TROUBLE RELAXING: NOT AT ALL
IF YOU CHECKED OFF ANY PROBLEMS ON THIS QUESTIONNAIRE, HOW DIFFICULT HAVE THESE PROBLEMS MADE IT FOR YOU TO DO YOUR WORK, TAKE CARE OF THINGS AT HOME, OR GET ALONG WITH OTHER PEOPLE: NOT DIFFICULT AT ALL
GAD7 TOTAL SCORE: 0

## 2018-12-20 ASSESSMENT — PAIN SCALES - GENERAL: PAINLEVEL: NO PAIN (0)

## 2018-12-20 NOTE — NURSING NOTE
"Chief Complaint   Patient presents with     Pre-Op Exam       Initial /74 (BP Location: Right arm, Patient Position: Sitting, Cuff Size: Adult Regular)   Pulse 85   Wt 77.3 kg (170 lb 6.4 oz)   SpO2 97%   BMI 29.25 kg/m   Estimated body mass index is 29.25 kg/m  as calculated from the following:    Height as of 9/13/18: 1.626 m (5' 4\").    Weight as of this encounter: 77.3 kg (170 lb 6.4 oz).  Medication Reconciliation: complete    Valery Alaniz MA  "

## 2018-12-21 ASSESSMENT — ANXIETY QUESTIONNAIRES: GAD7 TOTAL SCORE: 0

## 2018-12-27 ENCOUNTER — TELEPHONE (OUTPATIENT)
Dept: FAMILY MEDICINE | Facility: OTHER | Age: 73
End: 2018-12-27

## 2019-04-01 DIAGNOSIS — I63.9 LEFT SIDED CEREBRAL HEMISPHERE CEREBROVASCULAR ACCIDENT (H): ICD-10-CM

## 2019-04-01 DIAGNOSIS — E78.5 HYPERLIPIDEMIA, UNSPECIFIED HYPERLIPIDEMIA TYPE: ICD-10-CM

## 2019-04-02 RX ORDER — ROSUVASTATIN CALCIUM 5 MG/1
TABLET, COATED ORAL
Qty: 30 TABLET | Refills: 3 | Status: SHIPPED | OUTPATIENT
Start: 2019-04-02 | End: 2019-08-06

## 2019-04-02 NOTE — TELEPHONE ENCOUNTER
rosuvastatin (CRESTOR) 5 MG tablet  Last Written Prescription Date:  3/15/18  Last Fill Quantity: 30,   # refills: 11  Last Office Visit: 12/20/18  Future Office visit:

## 2019-05-10 DIAGNOSIS — I63.9 LEFT SIDED CEREBRAL HEMISPHERE CEREBROVASCULAR ACCIDENT (H): ICD-10-CM

## 2019-05-10 RX ORDER — CLOPIDOGREL BISULFATE 75 MG/1
TABLET ORAL
Qty: 90 TABLET | Refills: 3 | OUTPATIENT
Start: 2019-05-10

## 2019-05-10 RX ORDER — CLOPIDOGREL BISULFATE 75 MG/1
75 TABLET ORAL DAILY
Qty: 30 TABLET | Refills: 3 | Status: SHIPPED | OUTPATIENT
Start: 2019-05-10 | End: 2019-09-05

## 2019-05-10 NOTE — TELEPHONE ENCOUNTER
Plavix  Last Written Prescription Date: 6/23/17  Last Fill Quantity: 30 # of Refills: 3  Last Office Visit: 12/20/18

## 2019-08-06 DIAGNOSIS — I63.9 LEFT SIDED CEREBRAL HEMISPHERE CEREBROVASCULAR ACCIDENT (H): ICD-10-CM

## 2019-08-06 DIAGNOSIS — E78.5 HYPERLIPIDEMIA, UNSPECIFIED HYPERLIPIDEMIA TYPE: ICD-10-CM

## 2019-08-07 RX ORDER — ROSUVASTATIN CALCIUM 5 MG/1
TABLET, COATED ORAL
Qty: 90 TABLET | Refills: 0 | Status: SHIPPED | OUTPATIENT
Start: 2019-08-07 | End: 2020-01-10 | Stop reason: SINTOL

## 2019-09-05 DIAGNOSIS — I63.9 LEFT SIDED CEREBRAL HEMISPHERE CEREBROVASCULAR ACCIDENT (H): ICD-10-CM

## 2019-09-06 RX ORDER — CLOPIDOGREL BISULFATE 75 MG/1
TABLET ORAL
Qty: 30 TABLET | Refills: 2 | Status: SHIPPED | OUTPATIENT
Start: 2019-09-06 | End: 2019-12-12

## 2019-11-11 ENCOUNTER — ANCILLARY PROCEDURE (OUTPATIENT)
Dept: GENERAL RADIOLOGY | Facility: OTHER | Age: 74
End: 2019-11-11
Attending: FAMILY MEDICINE
Payer: MEDICARE

## 2019-11-11 ENCOUNTER — OFFICE VISIT (OUTPATIENT)
Dept: FAMILY MEDICINE | Facility: OTHER | Age: 74
End: 2019-11-11
Attending: FAMILY MEDICINE
Payer: MEDICARE

## 2019-11-11 VITALS
OXYGEN SATURATION: 98 % | TEMPERATURE: 98.2 F | BODY MASS INDEX: 25.74 KG/M2 | SYSTOLIC BLOOD PRESSURE: 128 MMHG | HEART RATE: 78 BPM | WEIGHT: 164 LBS | HEIGHT: 67 IN | DIASTOLIC BLOOD PRESSURE: 80 MMHG

## 2019-11-11 DIAGNOSIS — S22.000A COMPRESSION FRACTURE OF THORACIC VERTEBRA, INITIAL ENCOUNTER, UNSPECIFIED THORACIC VERTEBRAL LEVEL: Primary | ICD-10-CM

## 2019-11-11 DIAGNOSIS — M81.0 AGE RELATED OSTEOPOROSIS, UNSPECIFIED PATHOLOGICAL FRACTURE PRESENCE: ICD-10-CM

## 2019-11-11 DIAGNOSIS — Z79.02 VISIT FOR MONITORING PLAVIX THERAPY: ICD-10-CM

## 2019-11-11 DIAGNOSIS — E78.5 HYPERLIPIDEMIA, UNSPECIFIED HYPERLIPIDEMIA TYPE: ICD-10-CM

## 2019-11-11 DIAGNOSIS — M54.6 ACUTE LEFT-SIDED THORACIC BACK PAIN: ICD-10-CM

## 2019-11-11 DIAGNOSIS — Z51.81 VISIT FOR MONITORING PLAVIX THERAPY: ICD-10-CM

## 2019-11-11 LAB
ALBUMIN SERPL-MCNC: 3.7 G/DL (ref 3.4–5)
ALP SERPL-CCNC: 66 U/L (ref 40–150)
ALT SERPL W P-5'-P-CCNC: 18 U/L (ref 0–50)
ANION GAP SERPL CALCULATED.3IONS-SCNC: 5 MMOL/L (ref 3–14)
AST SERPL W P-5'-P-CCNC: 14 U/L (ref 0–45)
BILIRUB SERPL-MCNC: 1.4 MG/DL (ref 0.2–1.3)
BUN SERPL-MCNC: 20 MG/DL (ref 7–30)
CALCIUM SERPL-MCNC: 8.9 MG/DL (ref 8.5–10.1)
CHLORIDE SERPL-SCNC: 108 MMOL/L (ref 94–109)
CHOLEST SERPL-MCNC: 139 MG/DL
CO2 SERPL-SCNC: 28 MMOL/L (ref 20–32)
CREAT SERPL-MCNC: 0.84 MG/DL (ref 0.52–1.04)
GFR SERPL CREATININE-BSD FRML MDRD: 67 ML/MIN/{1.73_M2}
GLUCOSE SERPL-MCNC: 102 MG/DL (ref 70–99)
HDLC SERPL-MCNC: 55 MG/DL
LDLC SERPL CALC-MCNC: 64 MG/DL
NONHDLC SERPL-MCNC: 84 MG/DL
POTASSIUM SERPL-SCNC: 4.3 MMOL/L (ref 3.4–5.3)
PROT SERPL-MCNC: 6.7 G/DL (ref 6.8–8.8)
SODIUM SERPL-SCNC: 141 MMOL/L (ref 133–144)
TRIGL SERPL-MCNC: 100 MG/DL

## 2019-11-11 PROCEDURE — 72070 X-RAY EXAM THORAC SPINE 2VWS: CPT | Mod: TC

## 2019-11-11 PROCEDURE — 80061 LIPID PANEL: CPT | Mod: ZL | Performed by: FAMILY MEDICINE

## 2019-11-11 PROCEDURE — 36415 COLL VENOUS BLD VENIPUNCTURE: CPT | Mod: ZL | Performed by: FAMILY MEDICINE

## 2019-11-11 PROCEDURE — 80053 COMPREHEN METABOLIC PANEL: CPT | Mod: ZL | Performed by: FAMILY MEDICINE

## 2019-11-11 PROCEDURE — 99214 OFFICE O/P EST MOD 30 MIN: CPT | Performed by: FAMILY MEDICINE

## 2019-11-11 PROCEDURE — G0463 HOSPITAL OUTPT CLINIC VISIT: HCPCS

## 2019-11-11 PROCEDURE — 71101 X-RAY EXAM UNILAT RIBS/CHEST: CPT | Mod: TC,LT

## 2019-11-11 ASSESSMENT — MIFFLIN-ST. JEOR: SCORE: 1266.53

## 2019-11-11 ASSESSMENT — PAIN SCALES - GENERAL: PAINLEVEL: MODERATE PAIN (5)

## 2019-11-11 NOTE — NURSING NOTE
"Chief Complaint   Patient presents with     Musculoskeletal Problem       Initial /80 (BP Location: Right arm, Patient Position: Sitting, Cuff Size: Adult Regular)   Pulse 78   Temp 98.2  F (36.8  C) (Tympanic)   Ht 1.702 m (5' 7\")   Wt 74.4 kg (164 lb)   SpO2 98%   BMI 25.69 kg/m   Estimated body mass index is 25.69 kg/m  as calculated from the following:    Height as of this encounter: 1.702 m (5' 7\").    Weight as of this encounter: 74.4 kg (164 lb).  Medication Reconciliation: complete  Briana Rick LPN  "

## 2019-11-11 NOTE — PROGRESS NOTES
"Subjective     Emily Olson is a 74 year old female who presents to clinic today for the following health issues:    HPI   Back Pain       Duration: 1 month        Specific cause: none    Description:   Location of pain: thoracic - bra strap - left side only  Character of pain: dull ache and intermittent  Pain radiation:none  New numbness or weakness in legs, not attributed to pain:  no     Intensity: Currently 5/10, mild    History:   Pain interferes with job: Not applicable  History of back problems: no prior back problems  Any previous MRI or X-rays: None  Sees a specialist for back pain:  No  Therapies tried without relief: rest and sitting    Alleviating factors:   Improved by: NSAIDs      Precipitating factors:  Worsened by: Bending - such as over the sink; sitting in [place]  No night pain.  Not awakening from pain.  No fevers.   No abnormal bleeding.    Prior treatments for osteoporosis - intolerant to oral bisphosphonate, prior miacalcin, forteo.  Has not seen endocrine in years or had recent studies performed.  Has not been on any for years.      Was on Sinemet recent - March through summer.  Was started by neurology.  Felt \"goofy\" so stopped.  Was for tremor.  No diagnosis of parkinson's.    No falls or change in symptoms.  Has annual follow up.    Also, inquiring about upcoming EGD and Plavix.  Will be having esophageal dilation.  Has letter from [facility].  Is on Plavix since prior CVA.    Current Outpatient Medications   Medication     Calcium-Vitamin D-Vitamin K (CHEWABLE CALCIUM PO)     Cholecalciferol (VITAMIN D3) 2000 units CAPS     clopidogrel (PLAVIX) 75 MG tablet     fluticasone (FLONASE) 50 MCG/ACT spray     rosuvastatin (CRESTOR) 5 MG tablet     No current facility-administered medications for this visit.        Patient Active Problem List   Diagnosis     Bilateral lower extremity edema     Trochanteric bursitis, right hip     Urge incontinence     Pulmonary nodule     GERD " "(gastroesophageal reflux disease)     Osteoporosis     Hyperlipidemia, unspecified     ACP (advance care planning)     Left carotid artery occlusion     Left sided cerebral hemisphere cerebrovascular accident (H)     Past Surgical History:   Procedure Laterality Date     breast biopsy/benign      LT     COLONOSCOPY  10/22/2010    normal; Dr. Livingston; repeat 10 years     total abdominal hysterectomy  1976    cervix removed as well; [place]/BSO for endometriosis       Social History     Tobacco Use     Smoking status: Never Smoker     Smokeless tobacco: Never Used   Substance Use Topics     Alcohol use: No     Family History   Problem Relation Age of Onset     Cancer Father 62        Lung, cause of death     Breast Cancer Mother 90     Breast Cancer Sister      Breast Cancer Sister      Prostate Cancer Brother            Reviewed and updated as needed this visit by Provider         Review of Systems   ROS COMP: Constitutional, HEENT, cardiovascular, pulmonary, gi and gu systems are negative, except as otherwise noted.      Objective    /80 (BP Location: Right arm, Patient Position: Sitting, Cuff Size: Adult Regular)   Pulse 78   Temp 98.2  F (36.8  C) (Tympanic)   Ht 1.702 m (5' 7\")   Wt 74.4 kg (164 lb)   SpO2 98%   BMI 25.69 kg/m    Body mass index is 25.69 kg/m .  Physical Exam   GENERAL: healthy, alert and no distress  RESP: lungs clear to auscultation - no rales, rhonchi or wheezes  CV: regular rate and rhythm, normal S1 S2, no S3 or S4, no murmur, click or rub, no peripheral edema and peripheral pulses strong  MS: normal muscle tone, peripheral pulses normal and non-tender throughout spine and ribs and back, but indicates left posterior ribs, left thoracic region as area of pain; full AROM upper extremities without pain; normal  strength  SKIN: no suspicious lesions or rashes  NEURO: Normal strength and tone, sensory exam grossly normal, mentation intact and slight tremor - unchanged  PSYCH: mentation " "appears normal, affect normal/bright    Diagnostic Test Results:  Xray done        Assessment & Plan       ICD-10-CM    1. Acute left-sided thoracic back pain M54.6 XR Ribs & Chest Lt 3v     XR Thoracic Spine 2 Views        BMI:   Estimated body mass index is 25.69 kg/m  as calculated from the following:    Height as of this encounter: 1.702 m (5' 7\").    Weight as of this encounter: 74.4 kg (164 lb).     Has been years off osteoporosis medications.  May be time to restart.  Will reassess with DEXA.      Patient Instructions   Will call with xray results - appears to have rib fracture.  Deep breathing to prevent pneumonia.  Pain control.    DEXA - bone density scan - ordered - they will call you to schedule.    Stop Plavix 7 days prior to EGD procedure.          No follow-ups on file.    Theodora Thornton MD  [facility] [place]    "

## 2019-11-11 NOTE — PATIENT INSTRUCTIONS
Will call with xray results - appears to have rib fracture.  Deep breathing to prevent pneumonia.  Pain control.    DEXA - bone density scan - ordered - they will call you to schedule.    Stop Plavix 7 days prior to EGD procedure.

## 2019-11-15 ENCOUNTER — HOSPITAL ENCOUNTER (OUTPATIENT)
Dept: BONE DENSITY | Facility: HOSPITAL | Age: 74
Discharge: HOME | End: 2019-11-15
Attending: FAMILY MEDICINE | Admitting: FAMILY MEDICINE
Payer: MEDICARE

## 2019-11-15 ENCOUNTER — TELEPHONE (OUTPATIENT)
Dept: FAMILY MEDICINE | Facility: OTHER | Age: 74
End: 2019-11-15

## 2019-11-15 DIAGNOSIS — M81.0 AGE RELATED OSTEOPOROSIS, UNSPECIFIED PATHOLOGICAL FRACTURE PRESENCE: ICD-10-CM

## 2019-11-15 DIAGNOSIS — M81.0 AGE-RELATED OSTEOPOROSIS WITHOUT CURRENT PATHOLOGICAL FRACTURE: Primary | ICD-10-CM

## 2019-11-15 PROCEDURE — 77080 DXA BONE DENSITY AXIAL: CPT | Mod: TC

## 2019-11-15 NOTE — TELEPHONE ENCOUNTER
Patient wishes to see a specialist in endocrinology for the osteoporosis.  Please call patient with more information at [phone].  Thank you

## 2019-11-26 ENCOUNTER — TRANSFERRED RECORDS (OUTPATIENT)
Dept: HEALTH INFORMATION MANAGEMENT | Facility: CLINIC | Age: 74
End: 2019-11-26

## 2019-12-12 DIAGNOSIS — I63.9 LEFT SIDED CEREBRAL HEMISPHERE CEREBROVASCULAR ACCIDENT (H): ICD-10-CM

## 2019-12-12 DIAGNOSIS — T50.905A ADVERSE EFFECT OF DRUG, INITIAL ENCOUNTER: Primary | ICD-10-CM

## 2019-12-12 NOTE — LETTER
December 18, 2019      Emily Olson  [address] [place] [id number]        Dear Emily,       We are concerned about your health care.  We recently provided you with medication refills.  A CBC lab test is required every year in order to continue refilling your Plavix.  Orders have been placed in our computer and should be accessible at most [facility] labs. You WILL NOT need to be fasting for this lab. Please complete this as soon as possible. If you have any questions please call us at [phone].      Sincerely,  Theodora Thornton MD

## 2019-12-13 NOTE — TELEPHONE ENCOUNTER
Plavix       Last Written Prescription Date:  9/06/2019  Last Fill Quantity: 30,   # refills: 2  Last Office Visit: 11/11/2019  Future Office visit:

## 2019-12-17 RX ORDER — CLOPIDOGREL BISULFATE 75 MG/1
TABLET ORAL
Qty: 30 TABLET | Refills: 0 | Status: SHIPPED | OUTPATIENT
Start: 2019-12-17 | End: 2020-01-14

## 2020-01-09 PROBLEM — I69.320 APHASIA DUE TO RECENT CEREBROVASCULAR ACCIDENT: Status: ACTIVE | Noted: 2017-06-16

## 2020-01-09 RX ORDER — CARBIDOPA AND LEVODOPA 25; 100 MG/1; MG/1
TABLET ORAL
COMMUNITY
Start: 2019-07-21

## 2020-01-09 NOTE — PROGRESS NOTES
Subjective     Emily Olson is a 74 year old female who presents to clinic today for the following health issues:    HPI   Hyperlipidemia Follow-Up-Crestor      Are you regularly taking any medication or supplement to lower your cholesterol?   No    Are you having muscle aches or other side effects that you think could be caused by your cholesterol lowering medication?  Yes- quit taking the medication due to muscle aches    How many servings of fruits and vegetables do you eat daily?  0-1    On average, how many sweetened beverages do you drink each day (Examples: soda, juice, sweet tea, etc.  Do NOT count diet or artificially sweetened beverages)?   0    How many days per week do you miss taking your medication? 0    Fasting labs done 11/2019    Stopped Crestor prior to EGD 11/2019; didn't resume; was causing stiffness; willing to restart    Will follow up with Dr. Livingston 2/2020 again - scope results from prior - reflux esophagitis and squamous mucosa    Osteoporosis - follow up DEXA  Prior treatments for osteoporosis - intolerant to oral bisphosphonate, prior miacalcin, forteo.    DEXA done 11/15/19.  Referral placed to endocrine same time.  Per patient - never scheduled.    FINDINGS: Bone mineral density left hip measured 0.788 g/sq cm with a  T score -1.3. Bone mineral density in the femoral neck measured 0.604  g/sq cm with a T score -2.2. Bone mineral density lumbar spine L1 L4  measures 0.634 g/sq cm with a T score of -3.8.                                                                 IMPRESSION: The patient is osteoporotic and fracture risk is high    Prediabetes   Fasting glucose 102 with last lab.  a1c pending today.    History of CVA  On chronic Plavix.    Due for CBC - drawn today.    Current Outpatient Medications   Medication     Calcium-Vitamin D-Vitamin K (CHEWABLE CALCIUM PO)     carbidopa-levodopa (SINEMET)  MG tablet     Cholecalciferol (VITAMIN D3) 2000 units CAPS     clopidogrel  "(PLAVIX) 75 MG tablet     fluticasone (FLONASE) 50 MCG/ACT spray     rosuvastatin (CRESTOR) 5 MG tablet     No current facility-administered medications for this visit.        Patient Active Problem List   Diagnosis     Bilateral lower extremity edema     Trochanteric bursitis, right hip     Urge incontinence     Pulmonary nodule     GERD (gastroesophageal reflux disease)     Osteoporosis without current pathological fracture, unspecified osteoporosis type     Hyperlipidemia, unspecified     ACP (advance care planning)     Left carotid artery occlusion     Left sided cerebral hemisphere cerebrovascular accident (H)     Aphasia due to recent cerebrovascular accident     Past Surgical History:   Procedure Laterality Date     breast biopsy/benign      LT     COLONOSCOPY  10/22/2010    normal; Dr. Livingston; repeat 10 years     total abdominal hysterectomy  1976    cervix removed as well; [place]/BSO for endometriosis       Social History     Tobacco Use     Smoking status: Never Smoker     Smokeless tobacco: Never Used   Substance Use Topics     Alcohol use: No     Family History   Problem Relation Age of Onset     Cancer Father 62        Lung, cause of death     Breast Cancer Mother 90     Breast Cancer Sister      Breast Cancer Sister      Prostate Cancer Brother            Reviewed and updated as needed this visit by Provider  Tobacco  Allergies  Meds  Med Hx  Surg Hx  Fam Hx  Soc Hx        Review of Systems   ROS COMP: Constitutional, HEENT, cardiovascular, pulmonary, gi and gu systems are negative, except as otherwise noted.      Objective    /70   Pulse 68   Temp 98.3  F (36.8  C) (Tympanic)   Resp 18   Ht 1.613 m (5' 3.5\")   Wt 72.1 kg (159 lb)   SpO2 96%   BMI 27.72 kg/m    Body mass index is 27.72 kg/m .  Physical Exam   GENERAL: healthy, alert and no distress  NECK: no adenopathy, no asymmetry, masses, or scars and thyroid normal to palpation  RESP: lungs clear to auscultation - no rales, rhonchi " "or wheezes  CV: regular rate and rhythm, normal S1 S2, no S3 or S4, no murmur, click or rub, no peripheral edema and peripheral pulses strong  ABDOMEN: soft, nontender, no hepatosplenomegaly, no masses and bowel sounds normal  MS: no gross musculoskeletal defects noted, no edema  SKIN: no suspicious lesions or rashes  PSYCH: mentation appears normal, affect normal/bright    Diagnostic Test Results:  Labs reviewed in Epic  Additional labs pending        Assessment & Plan       ICD-10-CM    1. Hyperlipidemia, unspecified hyperlipidemia type E78.5 Estimated Average Glucose   2. Osteoporosis without current pathological fracture, unspecified osteoporosis type M81.0 Vitamin D Deficiency   3. Elevated glucose R73.09 Hemoglobin A1c     Glucose   4. History of CVA (cerebrovascular accident) Z86.73    5. Gastroesophageal reflux disease with esophagitis K21.0         BMI:   Estimated body mass index is 27.72 kg/m  as calculated from the following:    Height as of this encounter: 1.613 m (5' 3.5\").    Weight as of this encounter: 72.1 kg (159 lb).       Patient Instructions   Referral to endocrinology placed 11/2019.  [facility] endocrinology attempted to call you, but did not hear back.  Please call [phone] to schedule this consult for your osteoporosis.      Restart Crestor 5 mg for lipids, history of stroke.    [facility] shot [facility]18/19 [facility].     Will call with remainder of lab results - vitamin D, glucose/a1c.    Follow up with Dr. Livingston 2/2020 as planned.                      No follow-ups on file.    Theodora Thornton MD  [facility] [place]    "

## 2020-01-10 ENCOUNTER — OFFICE VISIT (OUTPATIENT)
Dept: FAMILY MEDICINE | Facility: OTHER | Age: 75
End: 2020-01-10
Attending: FAMILY MEDICINE
Payer: MEDICARE

## 2020-01-10 VITALS
OXYGEN SATURATION: 96 % | DIASTOLIC BLOOD PRESSURE: 70 MMHG | WEIGHT: 159 LBS | RESPIRATION RATE: 18 BRPM | TEMPERATURE: 98.3 F | HEART RATE: 68 BPM | SYSTOLIC BLOOD PRESSURE: 110 MMHG | HEIGHT: 64 IN | BODY MASS INDEX: 27.14 KG/M2

## 2020-01-10 DIAGNOSIS — R73.09 ELEVATED GLUCOSE: ICD-10-CM

## 2020-01-10 DIAGNOSIS — K21.00 GASTROESOPHAGEAL REFLUX DISEASE WITH ESOPHAGITIS: ICD-10-CM

## 2020-01-10 DIAGNOSIS — M81.0 OSTEOPOROSIS WITHOUT CURRENT PATHOLOGICAL FRACTURE, UNSPECIFIED OSTEOPOROSIS TYPE: ICD-10-CM

## 2020-01-10 DIAGNOSIS — T50.905A ADVERSE EFFECT OF DRUG, INITIAL ENCOUNTER: ICD-10-CM

## 2020-01-10 DIAGNOSIS — Z86.73 HISTORY OF CVA (CEREBROVASCULAR ACCIDENT): ICD-10-CM

## 2020-01-10 DIAGNOSIS — E78.5 HYPERLIPIDEMIA, UNSPECIFIED HYPERLIPIDEMIA TYPE: Primary | ICD-10-CM

## 2020-01-10 LAB
ERYTHROCYTE [DISTWIDTH] IN BLOOD BY AUTOMATED COUNT: 12.4 % (ref 10–15)
EST. AVERAGE GLUCOSE BLD GHB EST-MCNC: 114 MG/DL
GLUCOSE SERPL-MCNC: 91 MG/DL (ref 70–99)
HBA1C MFR BLD: 5.6 % (ref 0–5.6)
HCT VFR BLD AUTO: 47.6 % (ref 35–47)
HGB BLD-MCNC: 15.9 G/DL (ref 11.7–15.7)
MCH RBC QN AUTO: 31.2 PG (ref 26.5–33)
MCHC RBC AUTO-ENTMCNC: 33.4 G/DL (ref 31.5–36.5)
MCV RBC AUTO: 94 FL (ref 78–100)
PLATELET # BLD AUTO: 211 10E9/L (ref 150–450)
RBC # BLD AUTO: 5.09 10E12/L (ref 3.8–5.2)
WBC # BLD AUTO: 4.6 10E9/L (ref 4–11)

## 2020-01-10 PROCEDURE — 82306 VITAMIN D 25 HYDROXY: CPT | Mod: ZL | Performed by: FAMILY MEDICINE

## 2020-01-10 PROCEDURE — 85027 COMPLETE CBC AUTOMATED: CPT | Mod: ZL | Performed by: FAMILY MEDICINE

## 2020-01-10 PROCEDURE — 36415 COLL VENOUS BLD VENIPUNCTURE: CPT | Mod: ZL | Performed by: FAMILY MEDICINE

## 2020-01-10 PROCEDURE — 40000788 ZZHCL STATISTIC ESTIMATED AVERAGE GLUCOSE: Mod: ZL | Performed by: FAMILY MEDICINE

## 2020-01-10 PROCEDURE — G0463 HOSPITAL OUTPT CLINIC VISIT: HCPCS

## 2020-01-10 PROCEDURE — 99214 OFFICE O/P EST MOD 30 MIN: CPT | Performed by: FAMILY MEDICINE

## 2020-01-10 PROCEDURE — 82947 ASSAY GLUCOSE BLOOD QUANT: CPT | Mod: ZL | Performed by: FAMILY MEDICINE

## 2020-01-10 PROCEDURE — 83036 HEMOGLOBIN GLYCOSYLATED A1C: CPT | Mod: ZL | Performed by: FAMILY MEDICINE

## 2020-01-10 RX ORDER — ROSUVASTATIN CALCIUM 5 MG/1
5 TABLET, COATED ORAL DAILY
Qty: 90 TABLET | Refills: 3 | COMMUNITY
Start: 2020-01-10 | End: 2020-01-14

## 2020-01-10 ASSESSMENT — ANXIETY QUESTIONNAIRES
5. BEING SO RESTLESS THAT IT IS HARD TO SIT STILL: NOT AT ALL
1. FEELING NERVOUS, ANXIOUS, OR ON EDGE: NOT AT ALL
3. WORRYING TOO MUCH ABOUT DIFFERENT THINGS: NOT AT ALL
2. NOT BEING ABLE TO STOP OR CONTROL WORRYING: NOT AT ALL
7. FEELING AFRAID AS IF SOMETHING AWFUL MIGHT HAPPEN: NOT AT ALL
6. BECOMING EASILY ANNOYED OR IRRITABLE: NOT AT ALL
4. TROUBLE RELAXING: NOT AT ALL
GAD7 TOTAL SCORE: 0

## 2020-01-10 ASSESSMENT — PAIN SCALES - GENERAL: PAINLEVEL: NO PAIN (0)

## 2020-01-10 ASSESSMENT — MIFFLIN-ST. JEOR: SCORE: 1188.28

## 2020-01-10 ASSESSMENT — PATIENT HEALTH QUESTIONNAIRE - PHQ9: SUM OF ALL RESPONSES TO PHQ QUESTIONS 1-9: 0

## 2020-01-10 NOTE — NURSING NOTE
"Chief Complaint   Patient presents with     Osteoporosis     Lipids       Initial /70   Pulse 68   Temp 98.3  F (36.8  C) (Tympanic)   Resp 18   Ht 1.613 m (5' 3.5\")   Wt 72.1 kg (159 lb)   SpO2 96%   BMI 27.72 kg/m   Estimated body mass index is 27.72 kg/m  as calculated from the following:    Height as of this encounter: 1.613 m (5' 3.5\").    Weight as of this encounter: 72.1 kg (159 lb).  Medication Reconciliation: complete  Noemy Pereira LPN  "

## 2020-01-10 NOTE — PATIENT INSTRUCTIONS
Referral to endocrinology placed 11/2019.  [facility] endocrinology attempted to call you, but did not hear back.  Please call [phone] to schedule this consult for your osteoporosis.      Restart Crestor 5 mg for lipids, history of stroke.    Flu shot verified 10/18/19 at [facility].     Will call with remainder of lab results - vitamin D, glucose/a1c.    Follow up with Dr. Livingston 2/2020 as planned.

## 2020-01-11 ASSESSMENT — ANXIETY QUESTIONNAIRES: GAD7 TOTAL SCORE: 0

## 2020-01-13 DIAGNOSIS — I63.9 LEFT SIDED CEREBRAL HEMISPHERE CEREBROVASCULAR ACCIDENT (H): ICD-10-CM

## 2020-01-13 NOTE — TELEPHONE ENCOUNTER
Crestor  Last Written Prescription Date: 1/10/20  Last Fill Quantity: 90 # of Refills: 3  Last Office Visit: 1/10/20

## 2020-01-13 NOTE — TELEPHONE ENCOUNTER
plavix  Last Written Prescription Date: 12/17/19  Last Fill Quantity: 30 # of Refills: 0  Last Office Visit: 1/10/20

## 2020-01-14 DIAGNOSIS — I63.9 LEFT SIDED CEREBRAL HEMISPHERE CEREBROVASCULAR ACCIDENT (H): ICD-10-CM

## 2020-01-14 LAB — DEPRECATED CALCIDIOL+CALCIFEROL SERPL-MC: 43 UG/L (ref 20–75)

## 2020-01-14 RX ORDER — ROSUVASTATIN CALCIUM 5 MG/1
TABLET, COATED ORAL
Qty: 30 TABLET | Refills: 3 | Status: SHIPPED | OUTPATIENT
Start: 2020-01-14 | End: 2020-01-16

## 2020-01-14 RX ORDER — CLOPIDOGREL BISULFATE 75 MG/1
TABLET ORAL
Qty: 30 TABLET | Refills: 3 | Status: SHIPPED | OUTPATIENT
Start: 2020-01-14 | End: 2020-01-16

## 2020-01-15 RX ORDER — CLOPIDOGREL BISULFATE 75 MG/1
TABLET ORAL
Qty: 30 TABLET | Refills: 0 | OUTPATIENT
Start: 2020-01-15

## 2020-01-16 RX ORDER — ROSUVASTATIN CALCIUM 5 MG/1
TABLET, COATED ORAL
Qty: 90 TABLET | Refills: 0 | Status: SHIPPED | OUTPATIENT
Start: 2020-01-16

## 2020-01-16 RX ORDER — CLOPIDOGREL BISULFATE 75 MG/1
TABLET ORAL
Qty: 90 TABLET | Refills: 0 | Status: SHIPPED | OUTPATIENT
Start: 2020-01-16 | End: 2020-05-15

## 2020-03-09 DIAGNOSIS — M81.0 AGE-RELATED OSTEOPOROSIS WITHOUT CURRENT PATHOLOGICAL FRACTURE: Primary | ICD-10-CM

## 2020-05-14 DIAGNOSIS — I63.9 LEFT SIDED CEREBRAL HEMISPHERE CEREBROVASCULAR ACCIDENT (H): ICD-10-CM

## 2020-05-14 NOTE — TELEPHONE ENCOUNTER
Plavix      Last Written Prescription Date:  1-16-20  Last Fill Quantity: 90,   # refills: 0  Last Office Visit: 1-10-20  Future Office visit:       Routing refill request to provider for review/approval because:

## 2020-05-15 RX ORDER — CLOPIDOGREL BISULFATE 75 MG/1
TABLET ORAL
Qty: 90 TABLET | Refills: 1 | Status: SHIPPED | OUTPATIENT
Start: 2020-05-15 | End: 2020-11-11

## 2020-11-11 DIAGNOSIS — I63.9 LEFT SIDED CEREBRAL HEMISPHERE CEREBROVASCULAR ACCIDENT (H): ICD-10-CM

## 2020-11-11 RX ORDER — CLOPIDOGREL BISULFATE 75 MG/1
TABLET ORAL
Qty: 90 TABLET | Refills: 0 | Status: SHIPPED | OUTPATIENT
Start: 2020-11-11

## 2020-11-11 NOTE — TELEPHONE ENCOUNTER
clopidogrel (PLAVIX) 75 MG tablet     Last Written Prescription Date:  05/15/2020  Last Fill Quantity: 90,   # refills: 1  Last Office Visit: 01/10/2020  Future Office visit:       Routing refill request to provider for review/approval because:

## 2021-08-30 ENCOUNTER — TRANSFERRED RECORDS (OUTPATIENT)
Dept: HEALTH INFORMATION MANAGEMENT | Facility: CLINIC | Age: 76
End: 2021-08-30

## 2022-03-14 ENCOUNTER — TRANSFERRED RECORDS (OUTPATIENT)
Dept: HEALTH INFORMATION MANAGEMENT | Facility: CLINIC | Age: 77
End: 2022-03-14